# Patient Record
Sex: FEMALE | Race: WHITE | NOT HISPANIC OR LATINO | Employment: FULL TIME | URBAN - NONMETROPOLITAN AREA
[De-identification: names, ages, dates, MRNs, and addresses within clinical notes are randomized per-mention and may not be internally consistent; named-entity substitution may affect disease eponyms.]

---

## 2018-07-26 ENCOUNTER — CONVERSION ENCOUNTER (OUTPATIENT)
Dept: FAMILY MEDICINE CLINIC | Age: 58
End: 2018-07-26

## 2018-07-27 LAB
ALBUMIN SERPL-MCNC: 4.4 G/DL
ALBUMIN/GLOB SERPL: 1.8 {RATIO}
ALP SERPL-CCNC: 95 IU/L
ALT SERPL-CCNC: 32 IU/L
AST SERPL-CCNC: 22 IU/L
BASOPHILS # BLD AUTO: 0 X10E3/UL
BASOPHILS NFR BLD AUTO: 1 %
BILIRUB SERPL-MCNC: 0.6 MG/DL
BUN SERPL-MCNC: 21 MG/DL
BUN/CREAT SERPL: 20
CALCIUM SERPL-MCNC: 9.4 MG/DL
CHLORIDE SERPL-SCNC: 103 MMOL/L
CHOLEST SERPL-MCNC: 184 MG/DL
CO2 SERPL-SCNC: 22 MMOL/L
CONV IMMATURE GRAN: 0 %
CONV TOTAL PROTEIN: 6.9 G/DL
CREAT UR-MCNC: 1.07 MG/DL
EOSINOPHIL # BLD AUTO: 0.1 X10E3/UL
EOSINOPHIL # BLD AUTO: 2 %
ERYTHROCYTE [DISTWIDTH] IN BLOOD BY AUTOMATED COUNT: 13.9 %
GLOBULIN UR ELPH-MCNC: 2.5 G/DL
GLUCOSE SERPL-MCNC: 92 MG/DL
HCT VFR BLD AUTO: 39.7 %
HDLC SERPL-MCNC: 55 MG/DL
HGB BLD-MCNC: 13.6 G/DL
IMM GRANULOCYTES # BLD: 0 X10E3/UL
LDLC SERPL CALC-MCNC: 108 MG/DL
LYMPHOCYTES # BLD AUTO: 1.7 X10E3/UL
LYMPHOCYTES NFR BLD AUTO: 31 %
MCH RBC QN AUTO: 31.1 PG
MCHC RBC AUTO-ENTMCNC: 34.3 G/DL
MCV RBC AUTO: 91 FL
MONOCYTES # BLD AUTO: 0.6 X10E3/UL
MONOCYTES NFR BLD AUTO: 10 %
NEUTROPHILS # BLD AUTO: 3 X10E3/UL
NEUTROPHILS NFR BLD AUTO: 56 %
PLATELET # BLD AUTO: 249 X10E3/UL
POTASSIUM SERPL-SCNC: 4.3 MMOL/L
RBC # BLD AUTO: 4.38 X10E6/UL
SODIUM SERPL-SCNC: 139 MMOL/L
TRIGL SERPL-MCNC: 107 MG/DL
TSH SERPL-ACNC: 2.67 UIU/ML
VIT B12 SERPL-MCNC: 513 PG/ML
VLDLC SERPL-MCNC: 21 MG/DL
WBC # BLD AUTO: 5.4 X10E3/UL

## 2018-07-31 ENCOUNTER — OFFICE VISIT CONVERTED (OUTPATIENT)
Dept: FAMILY MEDICINE CLINIC | Age: 58
End: 2018-07-31
Attending: NURSE PRACTITIONER

## 2019-03-14 ENCOUNTER — CONVERSION ENCOUNTER (OUTPATIENT)
Dept: FAMILY MEDICINE CLINIC | Age: 59
End: 2019-03-14

## 2019-03-15 LAB
ALBUMIN SERPL-MCNC: 4.5 G/DL
ALBUMIN/GLOB SERPL: 2 {RATIO}
ALP SERPL-CCNC: 80 IU/L
ALT SERPL-CCNC: 16 IU/L
AST SERPL-CCNC: 14 IU/L
BILIRUB SERPL-MCNC: 0.6 MG/DL
BUN SERPL-MCNC: 18 MG/DL
BUN/CREAT SERPL: 17
CALCIUM SERPL-MCNC: 9.1 MG/DL
CHLORIDE SERPL-SCNC: 106 MMOL/L
CHOLEST SERPL-MCNC: 163 MG/DL
CO2 SERPL-SCNC: 20 MMOL/L
CONV TOTAL PROTEIN: 6.7 G/DL
CREAT UR-MCNC: 1.08 MG/DL
GLOBULIN UR ELPH-MCNC: 2.2 G/DL
GLUCOSE SERPL-MCNC: 92 MG/DL
HDLC SERPL-MCNC: 52 MG/DL
LDLC SERPL CALC-MCNC: 85 MG/DL
POTASSIUM SERPL-SCNC: 4.4 MMOL/L
SODIUM SERPL-SCNC: 143 MMOL/L
TRIGL SERPL-MCNC: 128 MG/DL
VLDLC SERPL-MCNC: 26 MG/DL

## 2019-03-19 ENCOUNTER — OFFICE VISIT CONVERTED (OUTPATIENT)
Dept: FAMILY MEDICINE CLINIC | Age: 59
End: 2019-03-19
Attending: NURSE PRACTITIONER

## 2019-05-07 ENCOUNTER — HOSPITAL ENCOUNTER (OUTPATIENT)
Dept: OTHER | Facility: HOSPITAL | Age: 59
Discharge: HOME | End: 2019-05-07
Attending: FAMILY MEDICINE

## 2019-05-07 ENCOUNTER — OFFICE VISIT CONVERTED (OUTPATIENT)
Dept: FAMILY MEDICINE CLINIC | Age: 59
End: 2019-05-07
Attending: FAMILY MEDICINE

## 2019-05-08 ENCOUNTER — HOSPITAL ENCOUNTER (OUTPATIENT)
Dept: PHYSICAL THERAPY | Facility: CLINIC | Age: 59
Setting detail: RECURRING SERIES
Discharge: HOME | End: 2019-06-10
Attending: FAMILY MEDICINE

## 2019-09-06 ENCOUNTER — CONVERSION ENCOUNTER (OUTPATIENT)
Dept: FAMILY MEDICINE CLINIC | Age: 59
End: 2019-09-06

## 2019-09-07 LAB
ALBUMIN SERPL-MCNC: 4.5 G/DL
ALBUMIN/GLOB SERPL: 2 {RATIO}
ALP SERPL-CCNC: 80 IU/L
ALT SERPL-CCNC: 23 IU/L
AST SERPL-CCNC: 18 IU/L
BILIRUB SERPL-MCNC: 0.7 MG/DL
BUN SERPL-MCNC: 16 MG/DL
BUN/CREAT SERPL: 16
CALCIUM SERPL-MCNC: 9.5 MG/DL
CHLORIDE SERPL-SCNC: 104 MMOL/L
CHOLEST SERPL-MCNC: 171 MG/DL
CO2 SERPL-SCNC: 21 MMOL/L
CONV TOTAL PROTEIN: 6.8 G/DL
CREAT UR-MCNC: 0.99 MG/DL
GLOBULIN UR ELPH-MCNC: 2.3 G/DL
GLUCOSE SERPL-MCNC: 90 MG/DL
HDLC SERPL-MCNC: 59 MG/DL
LDLC SERPL CALC-MCNC: 96 MG/DL
POTASSIUM SERPL-SCNC: 4.8 MMOL/L
SODIUM SERPL-SCNC: 141 MMOL/L
TRIGL SERPL-MCNC: 82 MG/DL
VLDLC SERPL-MCNC: 16 MG/DL

## 2019-09-13 ENCOUNTER — OFFICE VISIT CONVERTED (OUTPATIENT)
Dept: FAMILY MEDICINE CLINIC | Age: 59
End: 2019-09-13
Attending: NURSE PRACTITIONER

## 2019-09-18 ENCOUNTER — HOSPITAL ENCOUNTER (OUTPATIENT)
Dept: OTHER | Facility: HOSPITAL | Age: 59
Discharge: HOME | End: 2019-09-18
Attending: NURSE PRACTITIONER

## 2019-10-03 ENCOUNTER — HOSPITAL ENCOUNTER (OUTPATIENT)
Dept: OTHER | Facility: HOSPITAL | Age: 59
Discharge: HOME | End: 2019-10-03
Attending: NURSE PRACTITIONER

## 2020-04-21 ENCOUNTER — OFFICE VISIT CONVERTED (OUTPATIENT)
Dept: FAMILY MEDICINE CLINIC | Age: 60
End: 2020-04-21
Attending: NURSE PRACTITIONER

## 2020-09-17 ENCOUNTER — OFFICE VISIT CONVERTED (OUTPATIENT)
Dept: FAMILY MEDICINE CLINIC | Age: 60
End: 2020-09-17
Attending: NURSE PRACTITIONER

## 2020-09-18 LAB
ALBUMIN SERPL-MCNC: 4.7 G/DL
ALBUMIN/GLOB SERPL: 2 {RATIO}
ALP SERPL-CCNC: 76 IU/L
ALT SERPL-CCNC: 25 IU/L
AST SERPL-CCNC: 18 IU/L
BILIRUB SERPL-MCNC: 0.9 MG/DL
BUN SERPL-MCNC: 18 MG/DL
BUN/CREAT SERPL: 19
CALCIUM SERPL-MCNC: 9.8 MG/DL
CHLORIDE SERPL-SCNC: 100 MMOL/L
CHOLEST SERPL-MCNC: 186 MG/DL
CO2 SERPL-SCNC: 22 MMOL/L
CONV TOTAL PROTEIN: 7 G/DL
CREAT UR-MCNC: 0.97 MG/DL
GLOBULIN UR ELPH-MCNC: 2.3 G/DL
GLUCOSE SERPL-MCNC: 92 MG/DL
HDLC SERPL-MCNC: 67 MG/DL
LDLC SERPL CALC-MCNC: 108 MG/DL
POTASSIUM SERPL-SCNC: 4.6 MMOL/L
SODIUM SERPL-SCNC: 139 MMOL/L
TRIGL SERPL-MCNC: 59 MG/DL
VLDLC SERPL-MCNC: 11 MG/DL

## 2020-11-03 ENCOUNTER — HOSPITAL ENCOUNTER (OUTPATIENT)
Dept: OTHER | Facility: HOSPITAL | Age: 60
Discharge: HOME | End: 2020-11-03
Attending: NURSE PRACTITIONER

## 2021-04-14 ENCOUNTER — OFFICE VISIT CONVERTED (OUTPATIENT)
Dept: FAMILY MEDICINE CLINIC | Age: 61
End: 2021-04-14
Attending: NURSE PRACTITIONER

## 2021-04-22 LAB
ALBUMIN SERPL-MCNC: 4.4 G/DL
ALBUMIN/GLOB SERPL: 2.1 {RATIO}
ALP SERPL-CCNC: 73 IU/L
ALT SERPL-CCNC: 19 IU/L
AST SERPL-CCNC: 15 IU/L
BILIRUB SERPL-MCNC: 1 MG/DL
BUN SERPL-MCNC: 16 MG/DL
BUN/CREAT SERPL: 15
CALCIUM SERPL-MCNC: 9.3 MG/DL
CHLORIDE SERPL-SCNC: 99 MMOL/L
CHOLEST SERPL-MCNC: 169 MG/DL
CO2 SERPL-SCNC: 24 MMOL/L
CONV TOTAL PROTEIN: 6.5 G/DL
CREAT UR-MCNC: 1.04 MG/DL
GLOBULIN UR ELPH-MCNC: 2.1 G/DL
GLUCOSE SERPL-MCNC: 89 MG/DL
HDLC SERPL-MCNC: 64 MG/DL
LDLC SERPL CALC-MCNC: 91 MG/DL
POTASSIUM SERPL-SCNC: 4.8 MMOL/L
SODIUM SERPL-SCNC: 140 MMOL/L
TRIGL SERPL-MCNC: 74 MG/DL
VLDLC SERPL-MCNC: 14 MG/DL

## 2021-07-01 VITALS
TEMPERATURE: 98 F | HEIGHT: 63 IN | BODY MASS INDEX: 26.35 KG/M2 | HEART RATE: 65 BPM | SYSTOLIC BLOOD PRESSURE: 142 MMHG | WEIGHT: 148.7 LBS | DIASTOLIC BLOOD PRESSURE: 93 MMHG

## 2021-07-01 VITALS
HEART RATE: 80 BPM | SYSTOLIC BLOOD PRESSURE: 119 MMHG | HEIGHT: 63 IN | TEMPERATURE: 97.5 F | WEIGHT: 150.2 LBS | BODY MASS INDEX: 26.61 KG/M2 | DIASTOLIC BLOOD PRESSURE: 79 MMHG

## 2021-07-01 VITALS
WEIGHT: 146 LBS | TEMPERATURE: 97.8 F | DIASTOLIC BLOOD PRESSURE: 90 MMHG | HEART RATE: 57 BPM | SYSTOLIC BLOOD PRESSURE: 134 MMHG | HEIGHT: 63 IN | BODY MASS INDEX: 25.87 KG/M2

## 2021-07-01 VITALS
SYSTOLIC BLOOD PRESSURE: 126 MMHG | DIASTOLIC BLOOD PRESSURE: 61 MMHG | TEMPERATURE: 98.5 F | HEIGHT: 63 IN | BODY MASS INDEX: 25.98 KG/M2 | HEART RATE: 83 BPM | WEIGHT: 146.6 LBS

## 2021-07-02 VITALS
HEART RATE: 64 BPM | TEMPERATURE: 96.9 F | BODY MASS INDEX: 25.91 KG/M2 | SYSTOLIC BLOOD PRESSURE: 109 MMHG | WEIGHT: 146.2 LBS | HEIGHT: 63 IN | DIASTOLIC BLOOD PRESSURE: 76 MMHG

## 2021-10-05 RX ORDER — LOSARTAN POTASSIUM 50 MG/1
TABLET ORAL
Qty: 30 TABLET | Refills: 0 | Status: SHIPPED | OUTPATIENT
Start: 2021-10-05 | End: 2021-11-02

## 2021-10-05 RX ORDER — PRAVASTATIN SODIUM 10 MG
10 TABLET ORAL
COMMUNITY
Start: 2021-08-21 | End: 2021-11-10 | Stop reason: SDUPTHER

## 2021-10-05 RX ORDER — LOSARTAN POTASSIUM 50 MG/1
1 TABLET ORAL DAILY
COMMUNITY
Start: 2021-07-10 | End: 2021-10-05

## 2021-11-02 RX ORDER — LOSARTAN POTASSIUM 50 MG/1
TABLET ORAL
Qty: 30 TABLET | Refills: 0 | Status: SHIPPED | OUTPATIENT
Start: 2021-11-02 | End: 2021-11-10 | Stop reason: SDUPTHER

## 2021-11-10 ENCOUNTER — CLINICAL SUPPORT (OUTPATIENT)
Dept: FAMILY MEDICINE CLINIC | Age: 61
End: 2021-11-10

## 2021-11-10 ENCOUNTER — OFFICE VISIT (OUTPATIENT)
Dept: FAMILY MEDICINE CLINIC | Age: 61
End: 2021-11-10

## 2021-11-10 ENCOUNTER — LAB (OUTPATIENT)
Dept: LAB | Facility: HOSPITAL | Age: 61
End: 2021-11-10

## 2021-11-10 VITALS
SYSTOLIC BLOOD PRESSURE: 138 MMHG | HEART RATE: 62 BPM | DIASTOLIC BLOOD PRESSURE: 88 MMHG | BODY MASS INDEX: 26.47 KG/M2 | HEIGHT: 63 IN | WEIGHT: 149.4 LBS | TEMPERATURE: 98.4 F | OXYGEN SATURATION: 99 %

## 2021-11-10 DIAGNOSIS — M79.605 PAIN IN BOTH LOWER EXTREMITIES: ICD-10-CM

## 2021-11-10 DIAGNOSIS — E78.2 MIXED HYPERLIPIDEMIA: ICD-10-CM

## 2021-11-10 DIAGNOSIS — R53.83 FATIGUE, UNSPECIFIED TYPE: ICD-10-CM

## 2021-11-10 DIAGNOSIS — I10 ESSENTIAL HYPERTENSION: Primary | ICD-10-CM

## 2021-11-10 DIAGNOSIS — Z23 NEED FOR COVID-19 VACCINE: Primary | ICD-10-CM

## 2021-11-10 DIAGNOSIS — I10 ESSENTIAL HYPERTENSION: ICD-10-CM

## 2021-11-10 DIAGNOSIS — M79.604 PAIN IN BOTH LOWER EXTREMITIES: ICD-10-CM

## 2021-11-10 LAB
25(OH)D3 SERPL-MCNC: 25.2 NG/ML
ALBUMIN SERPL-MCNC: 4.5 G/DL (ref 3.5–5.2)
ALBUMIN/GLOB SERPL: 1.9 G/DL
ALP SERPL-CCNC: 81 U/L (ref 39–117)
ALT SERPL W P-5'-P-CCNC: 22 U/L (ref 1–33)
ANION GAP SERPL CALCULATED.3IONS-SCNC: 4.6 MMOL/L (ref 5–15)
AST SERPL-CCNC: 23 U/L (ref 1–32)
BASOPHILS # BLD AUTO: 0.02 10*3/MM3 (ref 0–0.2)
BASOPHILS NFR BLD AUTO: 0.4 % (ref 0–1.5)
BILIRUB SERPL-MCNC: 0.6 MG/DL (ref 0–1.2)
BUN SERPL-MCNC: 14 MG/DL (ref 8–23)
BUN/CREAT SERPL: 15.6 (ref 7–25)
CALCIUM SPEC-SCNC: 9.4 MG/DL (ref 8.6–10.5)
CHLORIDE SERPL-SCNC: 103 MMOL/L (ref 98–107)
CHOLEST SERPL-MCNC: 165 MG/DL (ref 0–200)
CO2 SERPL-SCNC: 28.4 MMOL/L (ref 22–29)
CREAT SERPL-MCNC: 0.9 MG/DL (ref 0.57–1)
DEPRECATED RDW RBC AUTO: 40.5 FL (ref 37–54)
EOSINOPHIL # BLD AUTO: 0.1 10*3/MM3 (ref 0–0.4)
EOSINOPHIL NFR BLD AUTO: 2 % (ref 0.3–6.2)
ERYTHROCYTE [DISTWIDTH] IN BLOOD BY AUTOMATED COUNT: 12.1 % (ref 12.3–15.4)
GFR SERPL CREATININE-BSD FRML MDRD: 64 ML/MIN/1.73
GLOBULIN UR ELPH-MCNC: 2.4 GM/DL
GLUCOSE SERPL-MCNC: 99 MG/DL (ref 65–99)
HCT VFR BLD AUTO: 37.4 % (ref 34–46.6)
HDLC SERPL-MCNC: 67 MG/DL (ref 40–60)
HGB BLD-MCNC: 13.2 G/DL (ref 12–15.9)
IMM GRANULOCYTES # BLD AUTO: 0.01 10*3/MM3 (ref 0–0.05)
IMM GRANULOCYTES NFR BLD AUTO: 0.2 % (ref 0–0.5)
LDLC SERPL CALC-MCNC: 83 MG/DL (ref 0–100)
LDLC/HDLC SERPL: 1.23 {RATIO}
LYMPHOCYTES # BLD AUTO: 1.56 10*3/MM3 (ref 0.7–3.1)
LYMPHOCYTES NFR BLD AUTO: 31.6 % (ref 19.6–45.3)
MCH RBC QN AUTO: 31.8 PG (ref 26.6–33)
MCHC RBC AUTO-ENTMCNC: 35.3 G/DL (ref 31.5–35.7)
MCV RBC AUTO: 90.1 FL (ref 79–97)
MONOCYTES # BLD AUTO: 0.49 10*3/MM3 (ref 0.1–0.9)
MONOCYTES NFR BLD AUTO: 9.9 % (ref 5–12)
NEUTROPHILS NFR BLD AUTO: 2.76 10*3/MM3 (ref 1.7–7)
NEUTROPHILS NFR BLD AUTO: 55.9 % (ref 42.7–76)
PLATELET # BLD AUTO: 270 10*3/MM3 (ref 140–450)
PMV BLD AUTO: 8.6 FL (ref 6–12)
POTASSIUM SERPL-SCNC: 4.6 MMOL/L (ref 3.5–5.2)
PROT SERPL-MCNC: 6.9 G/DL (ref 6–8.5)
RBC # BLD AUTO: 4.15 10*6/MM3 (ref 3.77–5.28)
SODIUM SERPL-SCNC: 136 MMOL/L (ref 136–145)
TRIGL SERPL-MCNC: 77 MG/DL (ref 0–150)
TSH SERPL DL<=0.05 MIU/L-ACNC: 2.15 UIU/ML (ref 0.27–4.2)
VIT B12 BLD-MCNC: 440 PG/ML (ref 211–946)
VLDLC SERPL-MCNC: 15 MG/DL (ref 5–40)
WBC # BLD AUTO: 4.94 10*3/MM3 (ref 3.4–10.8)

## 2021-11-10 PROCEDURE — 80053 COMPREHEN METABOLIC PANEL: CPT

## 2021-11-10 PROCEDURE — 82607 VITAMIN B-12: CPT

## 2021-11-10 PROCEDURE — 0003A COVID-19 (PFIZER): CPT | Performed by: FAMILY MEDICINE

## 2021-11-10 PROCEDURE — 99214 OFFICE O/P EST MOD 30 MIN: CPT | Performed by: NURSE PRACTITIONER

## 2021-11-10 PROCEDURE — 36415 COLL VENOUS BLD VENIPUNCTURE: CPT

## 2021-11-10 PROCEDURE — 82306 VITAMIN D 25 HYDROXY: CPT

## 2021-11-10 PROCEDURE — 80061 LIPID PANEL: CPT | Performed by: NURSE PRACTITIONER

## 2021-11-10 PROCEDURE — 91300 COVID-19 (PFIZER): CPT | Performed by: FAMILY MEDICINE

## 2021-11-10 PROCEDURE — 85025 COMPLETE CBC W/AUTO DIFF WBC: CPT

## 2021-11-10 PROCEDURE — 84443 ASSAY THYROID STIM HORMONE: CPT | Performed by: NURSE PRACTITIONER

## 2021-11-10 RX ORDER — LOSARTAN POTASSIUM 50 MG/1
50 TABLET ORAL DAILY
Qty: 90 TABLET | Refills: 1 | Status: SHIPPED | OUTPATIENT
Start: 2021-11-10 | End: 2022-01-06

## 2021-11-10 RX ORDER — PRAVASTATIN SODIUM 10 MG
10 TABLET ORAL
Qty: 90 TABLET | Refills: 1 | Status: SHIPPED | OUTPATIENT
Start: 2021-11-10 | End: 2022-05-10 | Stop reason: SDUPTHER

## 2021-11-10 RX ORDER — PHENOL 1.4 %
10 AEROSOL, SPRAY (ML) MUCOUS MEMBRANE NIGHTLY
COMMUNITY

## 2022-01-06 DIAGNOSIS — I10 ESSENTIAL HYPERTENSION: ICD-10-CM

## 2022-01-06 RX ORDER — LOSARTAN POTASSIUM 50 MG/1
TABLET ORAL
Qty: 30 TABLET | Refills: 4 | Status: SHIPPED | OUTPATIENT
Start: 2022-01-06 | End: 2022-05-10 | Stop reason: SDUPTHER

## 2022-05-10 ENCOUNTER — OFFICE VISIT (OUTPATIENT)
Dept: FAMILY MEDICINE CLINIC | Age: 62
End: 2022-05-10

## 2022-05-10 VITALS
BODY MASS INDEX: 26.65 KG/M2 | HEART RATE: 72 BPM | WEIGHT: 150.4 LBS | SYSTOLIC BLOOD PRESSURE: 125 MMHG | DIASTOLIC BLOOD PRESSURE: 88 MMHG | OXYGEN SATURATION: 98 % | HEIGHT: 63 IN

## 2022-05-10 DIAGNOSIS — H69.82 EUSTACHIAN TUBE DYSFUNCTION, LEFT: ICD-10-CM

## 2022-05-10 DIAGNOSIS — I10 ESSENTIAL HYPERTENSION: Primary | ICD-10-CM

## 2022-05-10 DIAGNOSIS — R94.31 ABNORMAL EKG: ICD-10-CM

## 2022-05-10 DIAGNOSIS — E78.2 MIXED HYPERLIPIDEMIA: ICD-10-CM

## 2022-05-10 DIAGNOSIS — R06.00 DYSPNEA, UNSPECIFIED TYPE: ICD-10-CM

## 2022-05-10 PROBLEM — H69.92 EUSTACHIAN TUBE DYSFUNCTION, LEFT: Status: ACTIVE | Noted: 2022-05-10

## 2022-05-10 PROCEDURE — 99214 OFFICE O/P EST MOD 30 MIN: CPT | Performed by: NURSE PRACTITIONER

## 2022-05-10 RX ORDER — LOSARTAN POTASSIUM 50 MG/1
50 TABLET ORAL DAILY
Qty: 90 TABLET | Refills: 1 | Status: SHIPPED | OUTPATIENT
Start: 2022-05-10 | End: 2022-11-10 | Stop reason: SDUPTHER

## 2022-05-10 RX ORDER — LORATADINE 10 MG/1
10 TABLET ORAL DAILY PRN
COMMUNITY
End: 2022-11-10

## 2022-05-10 RX ORDER — PRAVASTATIN SODIUM 10 MG
10 TABLET ORAL
Qty: 90 TABLET | Refills: 1 | Status: SHIPPED | OUTPATIENT
Start: 2022-05-10 | End: 2022-11-10 | Stop reason: SDUPTHER

## 2022-05-11 PROCEDURE — 93000 ELECTROCARDIOGRAM COMPLETE: CPT | Performed by: FAMILY MEDICINE

## 2022-05-13 ENCOUNTER — LAB (OUTPATIENT)
Dept: LAB | Facility: HOSPITAL | Age: 62
End: 2022-05-13

## 2022-05-13 ENCOUNTER — IMMUNIZATION (OUTPATIENT)
Dept: FAMILY MEDICINE CLINIC | Age: 62
End: 2022-05-13

## 2022-05-13 DIAGNOSIS — Z23 IMMUNIZATION DUE: Primary | ICD-10-CM

## 2022-05-13 DIAGNOSIS — R06.00 DYSPNEA, UNSPECIFIED TYPE: ICD-10-CM

## 2022-05-13 DIAGNOSIS — I10 ESSENTIAL HYPERTENSION: ICD-10-CM

## 2022-05-13 LAB
ALBUMIN SERPL-MCNC: 4.6 G/DL (ref 3.5–5.2)
ALBUMIN/GLOB SERPL: 2.4 G/DL
ALP SERPL-CCNC: 78 U/L (ref 39–117)
ALT SERPL W P-5'-P-CCNC: 32 U/L (ref 1–33)
ANION GAP SERPL CALCULATED.3IONS-SCNC: 11.3 MMOL/L (ref 5–15)
AST SERPL-CCNC: 24 U/L (ref 1–32)
BILIRUB SERPL-MCNC: 0.9 MG/DL (ref 0–1.2)
BUN SERPL-MCNC: 15 MG/DL (ref 8–23)
BUN/CREAT SERPL: 14.7 (ref 7–25)
CALCIUM SPEC-SCNC: 9.2 MG/DL (ref 8.6–10.5)
CHLORIDE SERPL-SCNC: 100 MMOL/L (ref 98–107)
CHOLEST SERPL-MCNC: 172 MG/DL (ref 0–200)
CO2 SERPL-SCNC: 22.7 MMOL/L (ref 22–29)
CREAT SERPL-MCNC: 1.02 MG/DL (ref 0.57–1)
EGFRCR SERPLBLD CKD-EPI 2021: 62.7 ML/MIN/1.73
GLOBULIN UR ELPH-MCNC: 1.9 GM/DL
GLUCOSE SERPL-MCNC: 96 MG/DL (ref 65–99)
HDLC SERPL-MCNC: 61 MG/DL (ref 40–60)
LDLC SERPL CALC-MCNC: 96 MG/DL (ref 0–100)
LDLC/HDLC SERPL: 1.56 {RATIO}
MAGNESIUM SERPL-MCNC: 2.1 MG/DL (ref 1.6–2.4)
POTASSIUM SERPL-SCNC: 4.4 MMOL/L (ref 3.5–5.2)
PROT SERPL-MCNC: 6.5 G/DL (ref 6–8.5)
SODIUM SERPL-SCNC: 134 MMOL/L (ref 136–145)
TRIGL SERPL-MCNC: 80 MG/DL (ref 0–150)
TSH SERPL DL<=0.05 MIU/L-ACNC: 2.44 UIU/ML (ref 0.27–4.2)
VLDLC SERPL-MCNC: 15 MG/DL (ref 5–40)

## 2022-05-13 PROCEDURE — 83735 ASSAY OF MAGNESIUM: CPT

## 2022-05-13 PROCEDURE — 80053 COMPREHEN METABOLIC PANEL: CPT

## 2022-05-13 PROCEDURE — 80061 LIPID PANEL: CPT

## 2022-05-13 PROCEDURE — 84443 ASSAY THYROID STIM HORMONE: CPT

## 2022-05-13 PROCEDURE — 0054A COVID-19 (PFIZER) 12+ YRS: CPT | Performed by: FAMILY MEDICINE

## 2022-05-13 PROCEDURE — 91305 COVID-19 (PFIZER) 12+ YRS: CPT | Performed by: FAMILY MEDICINE

## 2022-05-13 PROCEDURE — 36415 COLL VENOUS BLD VENIPUNCTURE: CPT

## 2022-05-14 DIAGNOSIS — E78.2 MIXED HYPERLIPIDEMIA: ICD-10-CM

## 2022-05-16 RX ORDER — PRAVASTATIN SODIUM 10 MG
10 TABLET ORAL
Qty: 90 TABLET | Refills: 1 | OUTPATIENT
Start: 2022-05-16

## 2022-05-19 DIAGNOSIS — I47.20 VENTRICULAR TACHYCARDIA: Primary | ICD-10-CM

## 2022-06-07 ENCOUNTER — HOSPITAL ENCOUNTER (OUTPATIENT)
Dept: CARDIOLOGY | Facility: HOSPITAL | Age: 62
Discharge: HOME | End: 2022-06-07
Admitting: NURSE PRACTITIONER

## 2022-06-07 DIAGNOSIS — R06.00 DYSPNEA, UNSPECIFIED TYPE: ICD-10-CM

## 2022-06-07 LAB
BH CV ECHO MEAS - AO ROOT DIAM: 2.6 CM
BH CV ECHO MEAS - EF(MOD-BP): 65 %
BH CV ECHO MEAS - IVSD: 1 CM
BH CV ECHO MEAS - LA DIMENSION: 3.6 CM
BH CV ECHO MEAS - LAT PEAK E' VEL: 6 CM/SEC
BH CV ECHO MEAS - LVIDD: 4.6 CM
BH CV ECHO MEAS - LVIDS: 2.9 CM
BH CV ECHO MEAS - LVPWD: 1 CM
BH CV ECHO MEAS - MED PEAK E' VEL: 4 CM/SEC
BH CV ECHO MEAS - MV A MAX VEL: 113 CM/SEC
BH CV ECHO MEAS - MV DEC TIME: 183 MSEC
BH CV ECHO MEAS - MV E MAX VEL: 96 CM/SEC
BH CV ECHO MEAS - MV E/A: 0.9
BH CV ECHO MEASUREMENTS AVERAGE E/E' RATIO: 19.2
IVRT: 61 MSEC
LEFT ATRIUM VOLUME INDEX: 21 ML/M2
MAXIMAL PREDICTED HEART RATE: 159 BPM
STRESS TARGET HR: 135 BPM

## 2022-06-07 PROCEDURE — 93306 TTE W/DOPPLER COMPLETE: CPT

## 2022-06-07 PROCEDURE — 93306 TTE W/DOPPLER COMPLETE: CPT | Performed by: INTERNAL MEDICINE

## 2022-06-08 ENCOUNTER — TELEPHONE (OUTPATIENT)
Dept: FAMILY MEDICINE CLINIC | Age: 62
End: 2022-06-08

## 2022-08-03 ENCOUNTER — OFFICE VISIT (OUTPATIENT)
Dept: CARDIOLOGY | Facility: CLINIC | Age: 62
End: 2022-08-03

## 2022-08-03 VITALS
DIASTOLIC BLOOD PRESSURE: 88 MMHG | BODY MASS INDEX: 27.42 KG/M2 | WEIGHT: 149 LBS | SYSTOLIC BLOOD PRESSURE: 126 MMHG | HEART RATE: 66 BPM | HEIGHT: 62 IN

## 2022-08-03 DIAGNOSIS — I49.3 FREQUENT PVCS: Primary | ICD-10-CM

## 2022-08-03 DIAGNOSIS — R94.31 HOLTER MONITOR, ABNORMAL: ICD-10-CM

## 2022-08-03 DIAGNOSIS — R06.09 EXERTIONAL DYSPNEA: ICD-10-CM

## 2022-08-03 PROCEDURE — 99203 OFFICE O/P NEW LOW 30 MIN: CPT | Performed by: INTERNAL MEDICINE

## 2022-09-08 ENCOUNTER — TELEPHONE (OUTPATIENT)
Dept: CARDIOLOGY | Facility: CLINIC | Age: 62
End: 2022-09-08

## 2022-11-10 ENCOUNTER — OFFICE VISIT (OUTPATIENT)
Dept: FAMILY MEDICINE CLINIC | Age: 62
End: 2022-11-10

## 2022-11-10 VITALS
DIASTOLIC BLOOD PRESSURE: 82 MMHG | HEART RATE: 81 BPM | WEIGHT: 149 LBS | HEIGHT: 62 IN | SYSTOLIC BLOOD PRESSURE: 138 MMHG | OXYGEN SATURATION: 99 % | BODY MASS INDEX: 27.42 KG/M2

## 2022-11-10 DIAGNOSIS — E78.2 MIXED HYPERLIPIDEMIA: ICD-10-CM

## 2022-11-10 DIAGNOSIS — Z12.31 ENCOUNTER FOR SCREENING MAMMOGRAM FOR MALIGNANT NEOPLASM OF BREAST: ICD-10-CM

## 2022-11-10 DIAGNOSIS — E55.9 VITAMIN D DEFICIENCY: Primary | ICD-10-CM

## 2022-11-10 DIAGNOSIS — I10 ESSENTIAL HYPERTENSION: ICD-10-CM

## 2022-11-10 DIAGNOSIS — R53.83 OTHER FATIGUE: ICD-10-CM

## 2022-11-10 DIAGNOSIS — J01.10 ACUTE NON-RECURRENT FRONTAL SINUSITIS: ICD-10-CM

## 2022-11-10 PROCEDURE — 99214 OFFICE O/P EST MOD 30 MIN: CPT | Performed by: NURSE PRACTITIONER

## 2022-11-10 RX ORDER — FEXOFENADINE HCL 180 MG/1
180 TABLET ORAL DAILY
COMMUNITY
End: 2023-01-19

## 2022-11-10 RX ORDER — LOSARTAN POTASSIUM 50 MG/1
50 TABLET ORAL DAILY
Qty: 90 TABLET | Refills: 1 | Status: SHIPPED | OUTPATIENT
Start: 2022-11-10

## 2022-11-10 RX ORDER — PRAVASTATIN SODIUM 10 MG
10 TABLET ORAL
Qty: 90 TABLET | Refills: 1 | Status: SHIPPED | OUTPATIENT
Start: 2022-11-10

## 2022-11-10 RX ORDER — AMOXICILLIN AND CLAVULANATE POTASSIUM 875; 125 MG/1; MG/1
1 TABLET, FILM COATED ORAL 2 TIMES DAILY
Qty: 20 TABLET | Refills: 0 | Status: SHIPPED | OUTPATIENT
Start: 2022-11-10 | End: 2023-01-19

## 2022-11-21 ENCOUNTER — TELEPHONE (OUTPATIENT)
Dept: FAMILY MEDICINE CLINIC | Age: 62
End: 2022-11-21

## 2022-11-22 ENCOUNTER — LAB (OUTPATIENT)
Dept: LAB | Facility: HOSPITAL | Age: 62
End: 2022-11-22

## 2022-11-22 DIAGNOSIS — E55.9 VITAMIN D DEFICIENCY: ICD-10-CM

## 2022-11-22 DIAGNOSIS — R53.83 OTHER FATIGUE: ICD-10-CM

## 2022-11-22 DIAGNOSIS — E78.2 MIXED HYPERLIPIDEMIA: ICD-10-CM

## 2022-11-22 LAB
25(OH)D3 SERPL-MCNC: 23.7 NG/ML (ref 30–100)
ALBUMIN SERPL-MCNC: 4.1 G/DL (ref 3.5–5.2)
ALBUMIN/GLOB SERPL: 1.9 G/DL
ALP SERPL-CCNC: 82 U/L (ref 39–117)
ALT SERPL W P-5'-P-CCNC: 24 U/L (ref 1–33)
ANION GAP SERPL CALCULATED.3IONS-SCNC: 10.5 MMOL/L (ref 5–15)
AST SERPL-CCNC: 17 U/L (ref 1–32)
BASOPHILS # BLD AUTO: 0.04 10*3/MM3 (ref 0–0.2)
BASOPHILS NFR BLD AUTO: 0.7 % (ref 0–1.5)
BILIRUB SERPL-MCNC: 0.7 MG/DL (ref 0–1.2)
BUN SERPL-MCNC: 17 MG/DL (ref 8–23)
BUN/CREAT SERPL: 16 (ref 7–25)
CALCIUM SPEC-SCNC: 9.1 MG/DL (ref 8.6–10.5)
CHLORIDE SERPL-SCNC: 103 MMOL/L (ref 98–107)
CHOLEST SERPL-MCNC: 179 MG/DL (ref 0–200)
CO2 SERPL-SCNC: 24.5 MMOL/L (ref 22–29)
CREAT SERPL-MCNC: 1.06 MG/DL (ref 0.57–1)
DEPRECATED RDW RBC AUTO: 45.9 FL (ref 37–54)
EGFRCR SERPLBLD CKD-EPI 2021: 59.9 ML/MIN/1.73
EOSINOPHIL # BLD AUTO: 0.13 10*3/MM3 (ref 0–0.4)
EOSINOPHIL NFR BLD AUTO: 2.3 % (ref 0.3–6.2)
ERYTHROCYTE [DISTWIDTH] IN BLOOD BY AUTOMATED COUNT: 13 % (ref 12.3–15.4)
GLOBULIN UR ELPH-MCNC: 2.2 GM/DL
GLUCOSE SERPL-MCNC: 91 MG/DL (ref 65–99)
HCT VFR BLD AUTO: 41.9 % (ref 34–46.6)
HDLC SERPL-MCNC: 60 MG/DL (ref 40–60)
HGB BLD-MCNC: 13.7 G/DL (ref 12–15.9)
IMM GRANULOCYTES # BLD AUTO: 0 10*3/MM3 (ref 0–0.05)
IMM GRANULOCYTES NFR BLD AUTO: 0 % (ref 0–0.5)
LDLC SERPL CALC-MCNC: 103 MG/DL (ref 0–100)
LDLC/HDLC SERPL: 1.68 {RATIO}
LYMPHOCYTES # BLD AUTO: 1.81 10*3/MM3 (ref 0.7–3.1)
LYMPHOCYTES NFR BLD AUTO: 31.6 % (ref 19.6–45.3)
MCH RBC QN AUTO: 31.3 PG (ref 26.6–33)
MCHC RBC AUTO-ENTMCNC: 32.7 G/DL (ref 31.5–35.7)
MCV RBC AUTO: 95.7 FL (ref 79–97)
MONOCYTES # BLD AUTO: 0.59 10*3/MM3 (ref 0.1–0.9)
MONOCYTES NFR BLD AUTO: 10.3 % (ref 5–12)
NEUTROPHILS NFR BLD AUTO: 3.16 10*3/MM3 (ref 1.7–7)
NEUTROPHILS NFR BLD AUTO: 55.1 % (ref 42.7–76)
PLATELET # BLD AUTO: 285 10*3/MM3 (ref 140–450)
PMV BLD AUTO: 9.1 FL (ref 6–12)
POTASSIUM SERPL-SCNC: 4.5 MMOL/L (ref 3.5–5.2)
PROT SERPL-MCNC: 6.3 G/DL (ref 6–8.5)
RBC # BLD AUTO: 4.38 10*6/MM3 (ref 3.77–5.28)
SODIUM SERPL-SCNC: 138 MMOL/L (ref 136–145)
TRIGL SERPL-MCNC: 90 MG/DL (ref 0–150)
TSH SERPL DL<=0.05 MIU/L-ACNC: 2.7 UIU/ML (ref 0.27–4.2)
VIT B12 BLD-MCNC: 463 PG/ML (ref 211–946)
VLDLC SERPL-MCNC: 16 MG/DL (ref 5–40)
WBC NRBC COR # BLD: 5.73 10*3/MM3 (ref 3.4–10.8)

## 2022-11-22 PROCEDURE — 80061 LIPID PANEL: CPT

## 2022-11-22 PROCEDURE — 80050 GENERAL HEALTH PANEL: CPT

## 2022-11-22 PROCEDURE — 82306 VITAMIN D 25 HYDROXY: CPT

## 2022-11-22 PROCEDURE — 82607 VITAMIN B-12: CPT

## 2022-11-22 PROCEDURE — 36415 COLL VENOUS BLD VENIPUNCTURE: CPT

## 2023-01-11 ENCOUNTER — HOSPITAL ENCOUNTER (OUTPATIENT)
Dept: MAMMOGRAPHY | Facility: HOSPITAL | Age: 63
Discharge: HOME | End: 2023-01-11
Admitting: NURSE PRACTITIONER
Payer: COMMERCIAL

## 2023-01-11 ENCOUNTER — CLINICAL SUPPORT (OUTPATIENT)
Dept: FAMILY MEDICINE CLINIC | Age: 63
End: 2023-01-11
Payer: COMMERCIAL

## 2023-01-11 DIAGNOSIS — Z23 IMMUNIZATION DUE: Primary | ICD-10-CM

## 2023-01-11 DIAGNOSIS — Z12.31 ENCOUNTER FOR SCREENING MAMMOGRAM FOR MALIGNANT NEOPLASM OF BREAST: ICD-10-CM

## 2023-01-11 PROCEDURE — 0124A PR ADM SARSCOV2 30MCG/0.3ML BST: CPT | Performed by: FAMILY MEDICINE

## 2023-01-11 PROCEDURE — 77067 SCR MAMMO BI INCL CAD: CPT

## 2023-01-11 PROCEDURE — 77063 BREAST TOMOSYNTHESIS BI: CPT

## 2023-01-11 PROCEDURE — 91312 COVID-19 (PFIZER) BIVALENT BOOSTER 12+YRS: CPT | Performed by: FAMILY MEDICINE

## 2023-01-19 ENCOUNTER — OFFICE VISIT (OUTPATIENT)
Dept: FAMILY MEDICINE CLINIC | Age: 63
End: 2023-01-19
Payer: COMMERCIAL

## 2023-01-19 ENCOUNTER — HOSPITAL ENCOUNTER (OUTPATIENT)
Dept: GENERAL RADIOLOGY | Facility: HOSPITAL | Age: 63
Discharge: HOME | End: 2023-01-19
Admitting: NURSE PRACTITIONER
Payer: COMMERCIAL

## 2023-01-19 ENCOUNTER — TELEPHONE (OUTPATIENT)
Dept: FAMILY MEDICINE CLINIC | Age: 63
End: 2023-01-19

## 2023-01-19 VITALS
BODY MASS INDEX: 27.97 KG/M2 | OXYGEN SATURATION: 97 % | WEIGHT: 152 LBS | DIASTOLIC BLOOD PRESSURE: 90 MMHG | HEART RATE: 72 BPM | HEIGHT: 62 IN | SYSTOLIC BLOOD PRESSURE: 134 MMHG

## 2023-01-19 DIAGNOSIS — M79.642 LEFT HAND PAIN: ICD-10-CM

## 2023-01-19 DIAGNOSIS — M25.532 LEFT WRIST PAIN: Primary | ICD-10-CM

## 2023-01-19 PROCEDURE — 99213 OFFICE O/P EST LOW 20 MIN: CPT | Performed by: NURSE PRACTITIONER

## 2023-01-19 PROCEDURE — 73090 X-RAY EXAM OF FOREARM: CPT

## 2023-01-19 PROCEDURE — 73130 X-RAY EXAM OF HAND: CPT

## 2023-02-17 DIAGNOSIS — M79.642 LEFT HAND PAIN: ICD-10-CM

## 2023-02-17 DIAGNOSIS — M25.532 LEFT WRIST PAIN: ICD-10-CM

## 2023-03-01 ENCOUNTER — OFFICE VISIT (OUTPATIENT)
Dept: CARDIOLOGY | Facility: CLINIC | Age: 63
End: 2023-03-01
Payer: COMMERCIAL

## 2023-03-01 VITALS
HEART RATE: 55 BPM | HEIGHT: 62 IN | BODY MASS INDEX: 27.97 KG/M2 | SYSTOLIC BLOOD PRESSURE: 135 MMHG | WEIGHT: 152 LBS | DIASTOLIC BLOOD PRESSURE: 79 MMHG

## 2023-03-01 DIAGNOSIS — R94.31 HOLTER MONITOR, ABNORMAL: ICD-10-CM

## 2023-03-01 DIAGNOSIS — I49.3 FREQUENT PVCS: ICD-10-CM

## 2023-03-01 PROCEDURE — 99213 OFFICE O/P EST LOW 20 MIN: CPT | Performed by: INTERNAL MEDICINE

## 2023-03-17 DIAGNOSIS — M25.532 LEFT WRIST PAIN: ICD-10-CM

## 2023-03-17 DIAGNOSIS — M79.642 LEFT HAND PAIN: ICD-10-CM

## 2023-05-10 ENCOUNTER — OFFICE VISIT (OUTPATIENT)
Dept: FAMILY MEDICINE CLINIC | Age: 63
End: 2023-05-10
Payer: COMMERCIAL

## 2023-05-10 VITALS
SYSTOLIC BLOOD PRESSURE: 133 MMHG | TEMPERATURE: 98.2 F | HEART RATE: 96 BPM | DIASTOLIC BLOOD PRESSURE: 64 MMHG | BODY MASS INDEX: 28.34 KG/M2 | OXYGEN SATURATION: 98 % | WEIGHT: 154 LBS | HEIGHT: 62 IN

## 2023-05-10 DIAGNOSIS — Z11.59 SCREENING FOR VIRAL DISEASE: ICD-10-CM

## 2023-05-10 DIAGNOSIS — I10 ESSENTIAL HYPERTENSION: Primary | ICD-10-CM

## 2023-05-10 DIAGNOSIS — H92.02 LEFT EAR PAIN: ICD-10-CM

## 2023-05-10 DIAGNOSIS — E55.9 VITAMIN D DEFICIENCY: ICD-10-CM

## 2023-05-10 DIAGNOSIS — E78.2 MIXED HYPERLIPIDEMIA: ICD-10-CM

## 2023-05-10 PROBLEM — M79.605 PAIN IN BOTH LOWER EXTREMITIES: Status: RESOLVED | Noted: 2021-11-10 | Resolved: 2023-05-10

## 2023-05-10 PROBLEM — M79.604 PAIN IN BOTH LOWER EXTREMITIES: Status: RESOLVED | Noted: 2021-11-10 | Resolved: 2023-05-10

## 2023-05-10 PROBLEM — H69.92 EUSTACHIAN TUBE DYSFUNCTION, LEFT: Status: RESOLVED | Noted: 2022-05-10 | Resolved: 2023-05-10

## 2023-05-10 PROBLEM — Z12.31 ENCOUNTER FOR SCREENING MAMMOGRAM FOR MALIGNANT NEOPLASM OF BREAST: Status: RESOLVED | Noted: 2022-11-10 | Resolved: 2023-05-10

## 2023-05-10 PROBLEM — H69.82 EUSTACHIAN TUBE DYSFUNCTION, LEFT: Status: RESOLVED | Noted: 2022-05-10 | Resolved: 2023-05-10

## 2023-05-10 PROBLEM — J01.10 ACUTE NON-RECURRENT FRONTAL SINUSITIS: Status: RESOLVED | Noted: 2022-11-10 | Resolved: 2023-05-10

## 2023-05-10 PROBLEM — M25.532 LEFT WRIST PAIN: Status: RESOLVED | Noted: 2023-01-19 | Resolved: 2023-05-10

## 2023-05-10 PROCEDURE — 99214 OFFICE O/P EST MOD 30 MIN: CPT | Performed by: NURSE PRACTITIONER

## 2023-05-10 RX ORDER — LOSARTAN POTASSIUM 50 MG/1
50 TABLET ORAL DAILY
Qty: 90 TABLET | Refills: 1 | Status: SHIPPED | OUTPATIENT
Start: 2023-05-10

## 2023-05-10 RX ORDER — PRAVASTATIN SODIUM 10 MG
10 TABLET ORAL
Qty: 90 TABLET | Refills: 1 | Status: SHIPPED | OUTPATIENT
Start: 2023-05-10

## 2023-05-10 RX ORDER — LORATADINE 10 MG/1
1 CAPSULE, LIQUID FILLED ORAL AS NEEDED
COMMUNITY

## 2023-05-15 ENCOUNTER — LAB (OUTPATIENT)
Dept: LAB | Facility: HOSPITAL | Age: 63
End: 2023-05-15
Payer: COMMERCIAL

## 2023-05-15 DIAGNOSIS — Z11.59 SCREENING FOR VIRAL DISEASE: ICD-10-CM

## 2023-05-15 DIAGNOSIS — E55.9 VITAMIN D DEFICIENCY: ICD-10-CM

## 2023-05-15 DIAGNOSIS — I10 ESSENTIAL HYPERTENSION: ICD-10-CM

## 2023-05-15 DIAGNOSIS — E78.2 MIXED HYPERLIPIDEMIA: ICD-10-CM

## 2023-05-15 LAB
25(OH)D3 SERPL-MCNC: 35.9 NG/ML (ref 30–100)
ALBUMIN SERPL-MCNC: 4.4 G/DL (ref 3.5–5.2)
ALBUMIN/GLOB SERPL: 2 G/DL
ALP SERPL-CCNC: 86 U/L (ref 39–117)
ALT SERPL W P-5'-P-CCNC: 31 U/L (ref 1–33)
ANION GAP SERPL CALCULATED.3IONS-SCNC: 10.8 MMOL/L (ref 5–15)
AST SERPL-CCNC: 20 U/L (ref 1–32)
BILIRUB SERPL-MCNC: 0.8 MG/DL (ref 0–1.2)
BUN SERPL-MCNC: 16 MG/DL (ref 8–23)
BUN/CREAT SERPL: 15.7 (ref 7–25)
CALCIUM SPEC-SCNC: 9.6 MG/DL (ref 8.6–10.5)
CHLORIDE SERPL-SCNC: 103 MMOL/L (ref 98–107)
CHOLEST SERPL-MCNC: 186 MG/DL (ref 0–200)
CO2 SERPL-SCNC: 23.2 MMOL/L (ref 22–29)
CREAT SERPL-MCNC: 1.02 MG/DL (ref 0.57–1)
EGFRCR SERPLBLD CKD-EPI 2021: 62.3 ML/MIN/1.73
GLOBULIN UR ELPH-MCNC: 2.2 GM/DL
GLUCOSE SERPL-MCNC: 96 MG/DL (ref 65–99)
HCV AB SER DONR QL: NORMAL
HDLC SERPL-MCNC: 53 MG/DL (ref 40–60)
LDLC SERPL CALC-MCNC: 109 MG/DL (ref 0–100)
LDLC/HDLC SERPL: 2 {RATIO}
POTASSIUM SERPL-SCNC: 4.6 MMOL/L (ref 3.5–5.2)
PROT SERPL-MCNC: 6.6 G/DL (ref 6–8.5)
SODIUM SERPL-SCNC: 137 MMOL/L (ref 136–145)
TRIGL SERPL-MCNC: 134 MG/DL (ref 0–150)
VLDLC SERPL-MCNC: 24 MG/DL (ref 5–40)

## 2023-05-15 PROCEDURE — 82306 VITAMIN D 25 HYDROXY: CPT

## 2023-05-15 PROCEDURE — 86803 HEPATITIS C AB TEST: CPT

## 2023-05-15 PROCEDURE — 80053 COMPREHEN METABOLIC PANEL: CPT

## 2023-05-15 PROCEDURE — 80061 LIPID PANEL: CPT

## 2023-05-15 PROCEDURE — 36415 COLL VENOUS BLD VENIPUNCTURE: CPT

## 2023-08-24 DIAGNOSIS — I10 ESSENTIAL HYPERTENSION: ICD-10-CM

## 2023-08-24 RX ORDER — LOSARTAN POTASSIUM 50 MG/1
50 TABLET ORAL DAILY
Qty: 90 TABLET | Refills: 1 | Status: SHIPPED | OUTPATIENT
Start: 2023-08-24

## 2023-09-13 ENCOUNTER — TELEPHONE (OUTPATIENT)
Dept: CARDIOLOGY | Facility: CLINIC | Age: 63
End: 2023-09-13
Payer: COMMERCIAL

## 2023-09-22 ENCOUNTER — TELEPHONE (OUTPATIENT)
Dept: CARDIOLOGY | Facility: CLINIC | Age: 63
End: 2023-09-22
Payer: COMMERCIAL

## 2023-09-29 ENCOUNTER — TELEPHONE (OUTPATIENT)
Dept: CARDIOLOGY | Facility: CLINIC | Age: 63
End: 2023-09-29
Payer: COMMERCIAL

## 2023-09-29 DIAGNOSIS — E78.2 MIXED HYPERLIPIDEMIA: ICD-10-CM

## 2023-09-29 DIAGNOSIS — R94.31 HOLTER MONITOR, ABNORMAL: ICD-10-CM

## 2023-09-29 DIAGNOSIS — I49.3 FREQUENT PVCS: ICD-10-CM

## 2023-09-29 RX ORDER — PRAVASTATIN SODIUM 10 MG
10 TABLET ORAL
Qty: 90 TABLET | Refills: 1 | Status: SHIPPED | OUTPATIENT
Start: 2023-09-29

## 2023-11-09 ENCOUNTER — OFFICE VISIT (OUTPATIENT)
Dept: FAMILY MEDICINE CLINIC | Age: 63
End: 2023-11-09
Payer: COMMERCIAL

## 2023-11-09 ENCOUNTER — LAB (OUTPATIENT)
Dept: LAB | Facility: HOSPITAL | Age: 63
End: 2023-11-09
Payer: COMMERCIAL

## 2023-11-09 VITALS
WEIGHT: 157 LBS | DIASTOLIC BLOOD PRESSURE: 87 MMHG | HEART RATE: 62 BPM | BODY MASS INDEX: 28.89 KG/M2 | OXYGEN SATURATION: 97 % | SYSTOLIC BLOOD PRESSURE: 138 MMHG | HEIGHT: 62 IN

## 2023-11-09 DIAGNOSIS — I10 ESSENTIAL HYPERTENSION: ICD-10-CM

## 2023-11-09 DIAGNOSIS — E55.9 VITAMIN D DEFICIENCY: ICD-10-CM

## 2023-11-09 DIAGNOSIS — E78.2 MIXED HYPERLIPIDEMIA: ICD-10-CM

## 2023-11-09 DIAGNOSIS — Z23 NEED FOR INFLUENZA VACCINATION: Primary | ICD-10-CM

## 2023-11-09 DIAGNOSIS — R05.9 COUGH, UNSPECIFIED TYPE: ICD-10-CM

## 2023-11-09 PROBLEM — Z11.59 SCREENING FOR VIRAL DISEASE: Status: RESOLVED | Noted: 2023-05-10 | Resolved: 2023-11-09

## 2023-11-09 LAB
ALBUMIN SERPL-MCNC: 4.5 G/DL (ref 3.5–5.2)
ALBUMIN/GLOB SERPL: 2 G/DL
ALP SERPL-CCNC: 90 U/L (ref 39–117)
ALT SERPL W P-5'-P-CCNC: 25 U/L (ref 1–33)
ANION GAP SERPL CALCULATED.3IONS-SCNC: 9 MMOL/L (ref 5–15)
AST SERPL-CCNC: 17 U/L (ref 1–32)
BILIRUB SERPL-MCNC: 0.7 MG/DL (ref 0–1.2)
BUN SERPL-MCNC: 14 MG/DL (ref 8–23)
BUN/CREAT SERPL: 15.2 (ref 7–25)
CALCIUM SPEC-SCNC: 9.4 MG/DL (ref 8.6–10.5)
CHLORIDE SERPL-SCNC: 103 MMOL/L (ref 98–107)
CHOLEST SERPL-MCNC: 203 MG/DL (ref 0–200)
CO2 SERPL-SCNC: 24 MMOL/L (ref 22–29)
CREAT SERPL-MCNC: 0.92 MG/DL (ref 0.57–1)
EGFRCR SERPLBLD CKD-EPI 2021: 70.5 ML/MIN/1.73
GLOBULIN UR ELPH-MCNC: 2.3 GM/DL
GLUCOSE SERPL-MCNC: 100 MG/DL (ref 65–99)
HDLC SERPL-MCNC: 52 MG/DL (ref 40–60)
LDLC SERPL CALC-MCNC: 126 MG/DL (ref 0–100)
LDLC/HDLC SERPL: 2.37 {RATIO}
POTASSIUM SERPL-SCNC: 4.4 MMOL/L (ref 3.5–5.2)
PROT SERPL-MCNC: 6.8 G/DL (ref 6–8.5)
SODIUM SERPL-SCNC: 136 MMOL/L (ref 136–145)
TRIGL SERPL-MCNC: 138 MG/DL (ref 0–150)
VLDLC SERPL-MCNC: 25 MG/DL (ref 5–40)

## 2023-11-09 PROCEDURE — 80053 COMPREHEN METABOLIC PANEL: CPT

## 2023-11-09 PROCEDURE — 80061 LIPID PANEL: CPT

## 2023-11-09 PROCEDURE — 36415 COLL VENOUS BLD VENIPUNCTURE: CPT

## 2023-11-09 RX ORDER — LOSARTAN POTASSIUM 50 MG/1
50 TABLET ORAL DAILY
Qty: 90 TABLET | Refills: 1 | Status: SHIPPED | OUTPATIENT
Start: 2023-11-09

## 2023-11-09 RX ORDER — PRAVASTATIN SODIUM 10 MG
10 TABLET ORAL
Qty: 90 TABLET | Refills: 1 | Status: SHIPPED | OUTPATIENT
Start: 2023-11-09

## 2023-12-05 ENCOUNTER — OFFICE VISIT (OUTPATIENT)
Dept: CARDIOLOGY | Facility: CLINIC | Age: 63
End: 2023-12-05
Payer: COMMERCIAL

## 2023-12-05 VITALS
SYSTOLIC BLOOD PRESSURE: 127 MMHG | HEIGHT: 62 IN | BODY MASS INDEX: 29.33 KG/M2 | WEIGHT: 159.4 LBS | DIASTOLIC BLOOD PRESSURE: 86 MMHG | HEART RATE: 50 BPM

## 2023-12-05 DIAGNOSIS — I10 ESSENTIAL HYPERTENSION: ICD-10-CM

## 2023-12-05 DIAGNOSIS — I49.3 FREQUENT PVCS: Primary | ICD-10-CM

## 2023-12-07 PROBLEM — I49.3 FREQUENT PVCS: Status: ACTIVE | Noted: 2023-12-07

## 2024-02-13 DIAGNOSIS — I10 ESSENTIAL HYPERTENSION: ICD-10-CM

## 2024-02-13 DIAGNOSIS — E78.2 MIXED HYPERLIPIDEMIA: ICD-10-CM

## 2024-02-13 RX ORDER — PRAVASTATIN SODIUM 10 MG
10 TABLET ORAL
Qty: 90 TABLET | Refills: 1 | OUTPATIENT
Start: 2024-02-13

## 2024-02-13 RX ORDER — LOSARTAN POTASSIUM 50 MG/1
50 TABLET ORAL DAILY
Qty: 90 TABLET | Refills: 1 | OUTPATIENT
Start: 2024-02-13

## 2024-02-15 ENCOUNTER — PATIENT MESSAGE (OUTPATIENT)
Dept: CARDIOLOGY | Facility: CLINIC | Age: 64
End: 2024-02-15
Payer: COMMERCIAL

## 2024-02-15 DIAGNOSIS — I10 ESSENTIAL HYPERTENSION: ICD-10-CM

## 2024-05-10 ENCOUNTER — TELEPHONE (OUTPATIENT)
Dept: CARDIOLOGY | Facility: CLINIC | Age: 64
End: 2024-05-10
Payer: COMMERCIAL

## 2024-05-10 ENCOUNTER — OFFICE VISIT (OUTPATIENT)
Dept: FAMILY MEDICINE CLINIC | Age: 64
End: 2024-05-10
Payer: COMMERCIAL

## 2024-05-10 ENCOUNTER — LAB (OUTPATIENT)
Dept: LAB | Facility: HOSPITAL | Age: 64
End: 2024-05-10
Payer: COMMERCIAL

## 2024-05-10 VITALS
BODY MASS INDEX: 29.26 KG/M2 | DIASTOLIC BLOOD PRESSURE: 75 MMHG | HEIGHT: 62 IN | HEART RATE: 46 BPM | WEIGHT: 159 LBS | OXYGEN SATURATION: 97 % | SYSTOLIC BLOOD PRESSURE: 140 MMHG

## 2024-05-10 DIAGNOSIS — R00.1 BRADYCARDIA: ICD-10-CM

## 2024-05-10 DIAGNOSIS — R00.1 BRADYCARDIA: Primary | ICD-10-CM

## 2024-05-10 DIAGNOSIS — E78.2 MIXED HYPERLIPIDEMIA: ICD-10-CM

## 2024-05-10 DIAGNOSIS — E55.9 VITAMIN D DEFICIENCY: ICD-10-CM

## 2024-05-10 DIAGNOSIS — I10 ESSENTIAL HYPERTENSION: ICD-10-CM

## 2024-05-10 DIAGNOSIS — I10 ESSENTIAL HYPERTENSION: Primary | ICD-10-CM

## 2024-05-10 PROBLEM — Z23 NEED FOR INFLUENZA VACCINATION: Status: RESOLVED | Noted: 2023-11-09 | Resolved: 2024-05-10

## 2024-05-10 PROBLEM — R05.9 COUGH: Status: RESOLVED | Noted: 2023-11-09 | Resolved: 2024-05-10

## 2024-05-10 PROBLEM — H92.02 LEFT EAR PAIN: Status: RESOLVED | Noted: 2023-05-10 | Resolved: 2024-05-10

## 2024-05-10 LAB
BASOPHILS # BLD AUTO: 0.03 10*3/MM3 (ref 0–0.2)
BASOPHILS NFR BLD AUTO: 0.5 % (ref 0–1.5)
DEPRECATED RDW RBC AUTO: 41.8 FL (ref 37–54)
EOSINOPHIL # BLD AUTO: 0.09 10*3/MM3 (ref 0–0.4)
EOSINOPHIL NFR BLD AUTO: 1.5 % (ref 0.3–6.2)
ERYTHROCYTE [DISTWIDTH] IN BLOOD BY AUTOMATED COUNT: 12.4 % (ref 12.3–15.4)
HCT VFR BLD AUTO: 42.2 % (ref 34–46.6)
HGB BLD-MCNC: 14.3 G/DL (ref 12–15.9)
IMM GRANULOCYTES # BLD AUTO: 0.01 10*3/MM3 (ref 0–0.05)
IMM GRANULOCYTES NFR BLD AUTO: 0.2 % (ref 0–0.5)
LYMPHOCYTES # BLD AUTO: 2.2 10*3/MM3 (ref 0.7–3.1)
LYMPHOCYTES NFR BLD AUTO: 36.3 % (ref 19.6–45.3)
MCH RBC QN AUTO: 30.7 PG (ref 26.6–33)
MCHC RBC AUTO-ENTMCNC: 33.9 G/DL (ref 31.5–35.7)
MCV RBC AUTO: 90.6 FL (ref 79–97)
MONOCYTES # BLD AUTO: 0.54 10*3/MM3 (ref 0.1–0.9)
MONOCYTES NFR BLD AUTO: 8.9 % (ref 5–12)
NEUTROPHILS NFR BLD AUTO: 3.19 10*3/MM3 (ref 1.7–7)
NEUTROPHILS NFR BLD AUTO: 52.6 % (ref 42.7–76)
PLATELET # BLD AUTO: 278 10*3/MM3 (ref 140–450)
PMV BLD AUTO: 9 FL (ref 6–12)
RBC # BLD AUTO: 4.66 10*6/MM3 (ref 3.77–5.28)
WBC NRBC COR # BLD AUTO: 6.06 10*3/MM3 (ref 3.4–10.8)

## 2024-05-10 PROCEDURE — 80050 GENERAL HEALTH PANEL: CPT

## 2024-05-10 PROCEDURE — 36415 COLL VENOUS BLD VENIPUNCTURE: CPT

## 2024-05-10 PROCEDURE — 80061 LIPID PANEL: CPT

## 2024-05-10 PROCEDURE — 83735 ASSAY OF MAGNESIUM: CPT

## 2024-05-10 PROCEDURE — 82306 VITAMIN D 25 HYDROXY: CPT

## 2024-05-10 PROCEDURE — 99214 OFFICE O/P EST MOD 30 MIN: CPT | Performed by: NURSE PRACTITIONER

## 2024-05-10 RX ORDER — PRAVASTATIN SODIUM 10 MG
10 TABLET ORAL
Qty: 90 TABLET | Refills: 1 | Status: SHIPPED | OUTPATIENT
Start: 2024-05-10

## 2024-05-10 RX ORDER — LOSARTAN POTASSIUM 50 MG/1
50 TABLET ORAL DAILY
Qty: 90 TABLET | Refills: 1 | Status: SHIPPED | OUTPATIENT
Start: 2024-05-10

## 2024-05-11 LAB
25(OH)D3 SERPL-MCNC: 39.8 NG/ML (ref 30–100)
ALBUMIN SERPL-MCNC: 4.7 G/DL (ref 3.5–5.2)
ALBUMIN/GLOB SERPL: 2 G/DL
ALP SERPL-CCNC: 81 U/L (ref 39–117)
ALT SERPL W P-5'-P-CCNC: 22 U/L (ref 1–33)
ANION GAP SERPL CALCULATED.3IONS-SCNC: 11 MMOL/L (ref 5–15)
AST SERPL-CCNC: 16 U/L (ref 1–32)
BILIRUB SERPL-MCNC: 0.9 MG/DL (ref 0–1.2)
BUN SERPL-MCNC: 12 MG/DL (ref 8–23)
BUN/CREAT SERPL: 11.5 (ref 7–25)
CALCIUM SPEC-SCNC: 9.3 MG/DL (ref 8.6–10.5)
CHLORIDE SERPL-SCNC: 102 MMOL/L (ref 98–107)
CHOLEST SERPL-MCNC: 174 MG/DL (ref 0–200)
CO2 SERPL-SCNC: 24 MMOL/L (ref 22–29)
CREAT SERPL-MCNC: 1.04 MG/DL (ref 0.57–1)
EGFRCR SERPLBLD CKD-EPI 2021: 60.5 ML/MIN/1.73
GLOBULIN UR ELPH-MCNC: 2.4 GM/DL
GLUCOSE SERPL-MCNC: 99 MG/DL (ref 65–99)
HDLC SERPL-MCNC: 53 MG/DL (ref 40–60)
LDLC SERPL CALC-MCNC: 98 MG/DL (ref 0–100)
LDLC/HDLC SERPL: 1.79 {RATIO}
MAGNESIUM SERPL-MCNC: 2.1 MG/DL (ref 1.6–2.4)
POTASSIUM SERPL-SCNC: 4.7 MMOL/L (ref 3.5–5.2)
PROT SERPL-MCNC: 7.1 G/DL (ref 6–8.5)
SODIUM SERPL-SCNC: 137 MMOL/L (ref 136–145)
TRIGL SERPL-MCNC: 130 MG/DL (ref 0–150)
TSH SERPL DL<=0.05 MIU/L-ACNC: 2.88 UIU/ML (ref 0.27–4.2)
VLDLC SERPL-MCNC: 23 MG/DL (ref 5–40)

## 2024-06-04 ENCOUNTER — PATIENT MESSAGE (OUTPATIENT)
Dept: CARDIOLOGY | Facility: CLINIC | Age: 64
End: 2024-06-04
Payer: COMMERCIAL

## 2024-06-13 RX ORDER — METOPROLOL SUCCINATE 50 MG/1
50 TABLET, EXTENDED RELEASE ORAL DAILY
Qty: 30 TABLET | Refills: 1 | Status: SHIPPED | OUTPATIENT
Start: 2024-06-13

## 2024-07-02 ENCOUNTER — OFFICE VISIT (OUTPATIENT)
Dept: CARDIOLOGY | Facility: CLINIC | Age: 64
End: 2024-07-02
Payer: COMMERCIAL

## 2024-07-02 VITALS
SYSTOLIC BLOOD PRESSURE: 130 MMHG | HEIGHT: 62 IN | WEIGHT: 153.8 LBS | BODY MASS INDEX: 28.3 KG/M2 | HEART RATE: 72 BPM | DIASTOLIC BLOOD PRESSURE: 69 MMHG

## 2024-07-02 DIAGNOSIS — I49.3 FREQUENT PVCS: Primary | ICD-10-CM

## 2024-07-02 DIAGNOSIS — I10 ESSENTIAL HYPERTENSION: ICD-10-CM

## 2024-07-02 PROCEDURE — 99213 OFFICE O/P EST LOW 20 MIN: CPT | Performed by: FAMILY MEDICINE

## 2024-07-26 ENCOUNTER — HOSPITAL ENCOUNTER (OUTPATIENT)
Dept: CARDIOLOGY | Facility: HOSPITAL | Age: 64
Discharge: HOME | End: 2024-07-26
Admitting: FAMILY MEDICINE
Payer: COMMERCIAL

## 2024-07-26 DIAGNOSIS — I49.3 FREQUENT PVCS: ICD-10-CM

## 2024-07-26 LAB
AORTIC DIMENSIONLESS INDEX: 0.45 (DI)
ASCENDING AORTA: 3.2 CM
BH CV ECHO MEAS - AO MAX PG: 20.4 MMHG
BH CV ECHO MEAS - AO MEAN PG: 8.1 MMHG
BH CV ECHO MEAS - AO ROOT DIAM: 3.2 CM
BH CV ECHO MEAS - AO V2 MAX: 225.9 CM/SEC
BH CV ECHO MEAS - AO V2 VTI: 42.1 CM
BH CV ECHO MEAS - AVA(I,D): 1.6 CM2
BH CV ECHO MEAS - EDV(CUBED): 128.4 ML
BH CV ECHO MEAS - EDV(MOD-SP2): 75.7 ML
BH CV ECHO MEAS - EDV(MOD-SP4): 95.3 ML
BH CV ECHO MEAS - EF(MOD-SP2): 51.9 %
BH CV ECHO MEAS - EF(MOD-SP4): 50.3 %
BH CV ECHO MEAS - ESV(CUBED): 35.3 ML
BH CV ECHO MEAS - ESV(MOD-SP2): 36.4 ML
BH CV ECHO MEAS - ESV(MOD-SP4): 47.4 ML
BH CV ECHO MEAS - FS: 35 %
BH CV ECHO MEAS - IVS/LVPW: 0.95 CM
BH CV ECHO MEAS - IVSD: 0.99 CM
BH CV ECHO MEAS - LA DIMENSION: 3.8 CM
BH CV ECHO MEAS - LV DIASTOLIC VOL/BSA (35-75): 55.9 CM2
BH CV ECHO MEAS - LV MASS(C)D: 188.8 GRAMS
BH CV ECHO MEAS - LV MAX PG: 3.8 MMHG
BH CV ECHO MEAS - LV MEAN PG: 2.06 MMHG
BH CV ECHO MEAS - LV SYSTOLIC VOL/BSA (12-30): 27.8 CM2
BH CV ECHO MEAS - LV V1 MAX: 97.5 CM/SEC
BH CV ECHO MEAS - LV V1 VTI: 19.1 CM
BH CV ECHO MEAS - LVIDD: 5 CM
BH CV ECHO MEAS - LVIDS: 3.3 CM
BH CV ECHO MEAS - LVOT AREA: 3.7 CM2
BH CV ECHO MEAS - LVOT DIAM: 2.16 CM
BH CV ECHO MEAS - LVPWD: 1.04 CM
BH CV ECHO MEAS - MV A MAX VEL: 112.6 CM/SEC
BH CV ECHO MEAS - MV DEC TIME: 0.24 SEC
BH CV ECHO MEAS - MV E MAX VEL: 62 CM/SEC
BH CV ECHO MEAS - MV E/A: 0.55
BH CV ECHO MEAS - PA V2 MAX: 101 CM/SEC
BH CV ECHO MEAS - RVDD: 3.2 CM
BH CV ECHO MEAS - SV(LVOT): 70.2 ML
BH CV ECHO MEAS - SV(MOD-SP2): 39.3 ML
BH CV ECHO MEAS - SV(MOD-SP4): 47.9 ML
BH CV ECHO MEAS - SVI(LVOT): 41.2 ML/M2
BH CV ECHO MEAS - SVI(MOD-SP2): 23 ML/M2
BH CV ECHO MEAS - SVI(MOD-SP4): 28.1 ML/M2
BH CV ECHO MEAS - TAPSE (>1.6): 2.39 CM
BH CV XLRA - TDI S': 20.7 CM/SEC
IVRT: 108 MS
LEFT ATRIUM VOLUME INDEX: 26 ML/M2

## 2024-07-26 PROCEDURE — 93306 TTE W/DOPPLER COMPLETE: CPT

## 2024-07-28 DIAGNOSIS — I31.39 PERICARDIAL EFFUSION: Primary | ICD-10-CM

## 2024-07-29 ENCOUNTER — TELEPHONE (OUTPATIENT)
Dept: CARDIOLOGY | Facility: CLINIC | Age: 64
End: 2024-07-29
Payer: COMMERCIAL

## 2024-08-06 RX ORDER — METOPROLOL SUCCINATE 50 MG/1
50 TABLET, EXTENDED RELEASE ORAL DAILY
Qty: 30 TABLET | Refills: 1 | Status: SHIPPED | OUTPATIENT
Start: 2024-08-06

## 2024-08-29 ENCOUNTER — OFFICE VISIT (OUTPATIENT)
Age: 64
End: 2024-08-29
Payer: COMMERCIAL

## 2024-08-29 VITALS
HEIGHT: 62 IN | HEART RATE: 60 BPM | BODY MASS INDEX: 27.79 KG/M2 | SYSTOLIC BLOOD PRESSURE: 138 MMHG | DIASTOLIC BLOOD PRESSURE: 94 MMHG | WEIGHT: 151 LBS

## 2024-08-29 DIAGNOSIS — I49.3 PVC (PREMATURE VENTRICULAR CONTRACTION): Primary | ICD-10-CM

## 2024-08-29 DIAGNOSIS — I49.3 FREQUENT PVCS: ICD-10-CM

## 2024-08-29 PROCEDURE — 93000 ELECTROCARDIOGRAM COMPLETE: CPT | Performed by: INTERNAL MEDICINE

## 2024-08-29 PROCEDURE — 99213 OFFICE O/P EST LOW 20 MIN: CPT | Performed by: INTERNAL MEDICINE

## 2024-09-09 ENCOUNTER — HOSPITAL ENCOUNTER (OUTPATIENT)
Dept: CARDIOLOGY | Facility: HOSPITAL | Age: 64
Discharge: HOME | End: 2024-09-09
Admitting: FAMILY MEDICINE
Payer: COMMERCIAL

## 2024-09-09 DIAGNOSIS — I31.39 PERICARDIAL EFFUSION: ICD-10-CM

## 2024-09-09 PROCEDURE — 93308 TTE F-UP OR LMTD: CPT

## 2024-09-09 PROCEDURE — 93321 DOPPLER ECHO F-UP/LMTD STD: CPT

## 2024-09-13 ENCOUNTER — TELEPHONE (OUTPATIENT)
Dept: CARDIOLOGY | Facility: CLINIC | Age: 64
End: 2024-09-13
Payer: COMMERCIAL

## 2024-10-04 ENCOUNTER — PATIENT MESSAGE (OUTPATIENT)
Dept: CARDIOLOGY | Facility: CLINIC | Age: 64
End: 2024-10-04
Payer: COMMERCIAL

## 2024-10-04 RX ORDER — METOPROLOL SUCCINATE 50 MG/1
50 TABLET, EXTENDED RELEASE ORAL DAILY
Qty: 90 TABLET | Refills: 0 | Status: SHIPPED | OUTPATIENT
Start: 2024-10-04 | End: 2024-10-05 | Stop reason: SDUPTHER

## 2024-10-07 RX ORDER — METOPROLOL SUCCINATE 50 MG/1
50 TABLET, EXTENDED RELEASE ORAL DAILY
Qty: 90 TABLET | Refills: 0 | Status: SHIPPED | OUTPATIENT
Start: 2024-10-07

## 2024-11-12 ENCOUNTER — OFFICE VISIT (OUTPATIENT)
Dept: FAMILY MEDICINE CLINIC | Age: 64
End: 2024-11-12
Payer: COMMERCIAL

## 2024-11-12 ENCOUNTER — LAB (OUTPATIENT)
Dept: LAB | Facility: HOSPITAL | Age: 64
End: 2024-11-12
Payer: COMMERCIAL

## 2024-11-12 VITALS
TEMPERATURE: 97.2 F | SYSTOLIC BLOOD PRESSURE: 134 MMHG | HEIGHT: 62 IN | BODY MASS INDEX: 28.16 KG/M2 | OXYGEN SATURATION: 98 % | HEART RATE: 61 BPM | DIASTOLIC BLOOD PRESSURE: 86 MMHG | WEIGHT: 153 LBS

## 2024-11-12 DIAGNOSIS — Z12.31 BREAST CANCER SCREENING BY MAMMOGRAM: Primary | ICD-10-CM

## 2024-11-12 DIAGNOSIS — E78.2 MIXED HYPERLIPIDEMIA: ICD-10-CM

## 2024-11-12 DIAGNOSIS — I10 ESSENTIAL HYPERTENSION: ICD-10-CM

## 2024-11-12 LAB
ALBUMIN SERPL-MCNC: 4.5 G/DL (ref 3.5–5.2)
ALBUMIN/GLOB SERPL: 2 G/DL
ALP SERPL-CCNC: 102 U/L (ref 39–117)
ALT SERPL W P-5'-P-CCNC: 46 U/L (ref 1–33)
ANION GAP SERPL CALCULATED.3IONS-SCNC: 12 MMOL/L (ref 5–15)
AST SERPL-CCNC: 27 U/L (ref 1–32)
BILIRUB SERPL-MCNC: 0.8 MG/DL (ref 0–1.2)
BUN SERPL-MCNC: 15 MG/DL (ref 8–23)
BUN/CREAT SERPL: 16.5 (ref 7–25)
CALCIUM SPEC-SCNC: 9.5 MG/DL (ref 8.6–10.5)
CHLORIDE SERPL-SCNC: 98 MMOL/L (ref 98–107)
CHOLEST SERPL-MCNC: 191 MG/DL (ref 0–200)
CO2 SERPL-SCNC: 22 MMOL/L (ref 22–29)
CREAT SERPL-MCNC: 0.91 MG/DL (ref 0.57–1)
EGFRCR SERPLBLD CKD-EPI 2021: 71 ML/MIN/1.73
GLOBULIN UR ELPH-MCNC: 2.3 GM/DL
GLUCOSE SERPL-MCNC: 92 MG/DL (ref 65–99)
HDLC SERPL-MCNC: 54 MG/DL (ref 40–60)
LDLC SERPL CALC-MCNC: 118 MG/DL (ref 0–100)
LDLC/HDLC SERPL: 2.14 {RATIO}
POTASSIUM SERPL-SCNC: 4.6 MMOL/L (ref 3.5–5.2)
PROT SERPL-MCNC: 6.8 G/DL (ref 6–8.5)
SODIUM SERPL-SCNC: 132 MMOL/L (ref 136–145)
TRIGL SERPL-MCNC: 108 MG/DL (ref 0–150)
VLDLC SERPL-MCNC: 19 MG/DL (ref 5–40)

## 2024-11-12 PROCEDURE — 36415 COLL VENOUS BLD VENIPUNCTURE: CPT

## 2024-11-12 PROCEDURE — 80053 COMPREHEN METABOLIC PANEL: CPT

## 2024-11-12 PROCEDURE — 80061 LIPID PANEL: CPT

## 2024-11-12 RX ORDER — LOSARTAN POTASSIUM 50 MG/1
50 TABLET ORAL DAILY
Qty: 90 TABLET | Refills: 1 | Status: SHIPPED | OUTPATIENT
Start: 2024-11-12

## 2024-11-12 RX ORDER — PRAVASTATIN SODIUM 10 MG
10 TABLET ORAL
Qty: 90 TABLET | Refills: 1 | Status: SHIPPED | OUTPATIENT
Start: 2024-11-12

## 2024-11-12 NOTE — PROGRESS NOTES
"Chief Complaint  Hyperlipidemia (6 month follow up ), Hypertension, and Vitamin D Deficiency    Subjective          Lluvia Becerra presents to [facility]     Patient is a 63-year-old female here today to follow-up regarding hyperlipidemia.  She is taking pravastatin 10 mg at bedtime.  Denies side effects and requests refills.    Cardiology manages PVCs and she continues to take metoprolol 50 mg daily.  Patient denies any episode of low heart rate.    This office prescribes losartan 50 mg daily for hypertension.  Patient reports blood pressures under good control.  Denies side effects and requests refills.    Patient has never had an abnormal Pap smear.  It has been high while in longer than 3 years since her last Pap smear.  Will perform Pap smear with her next appointment.    Recommend tetanus booster once every 10 years and may receive here at the pharmacy.     Objective   Vital Signs:   Vitals:    11/12/24 1042 11/12/24 1112   BP: 145/91 134/86   BP Location: Left arm Right arm   Patient Position: Sitting Sitting   Cuff Size: Adult Adult   Pulse: 61    Temp: 97.2 °F (36.2 °C)    TempSrc: Temporal    SpO2: 98%    Weight: 69.4 kg (153 lb)    Height: 157.5 cm (62\")        Wt Readings from Last 3 Encounters:   11/12/24 69.4 kg (153 lb)   08/29/24 68.5 kg (151 lb)   07/02/24 69.8 kg (153 lb 12.8 oz)      BP Readings from Last 3 Encounters:   11/12/24 134/86   08/29/24 138/94   07/02/24 130/69       Body mass index is 27.98 kg/m².             Physical Exam  Vitals reviewed.   Constitutional:       General: She is not in acute distress.     Appearance: Normal appearance. She is well-developed.   Neck:      Thyroid: No thyromegaly.      Vascular: No carotid bruit.   Cardiovascular:      Rate and Rhythm: Normal rate and regular rhythm.      Pulses:           Posterior tibial pulses are 2+ on the right side and 2+ on the left side.      Heart sounds: Normal heart sounds.   Pulmonary: "      Effort: Pulmonary effort is normal.      Breath sounds: Normal breath sounds.   Musculoskeletal:      Right lower leg: No edema.      Left lower leg: No edema.   Skin:     General: Skin is warm and dry.   Neurological:      General: No focal deficit present.      Mental Status: She is alert.   Psychiatric:         Attention and Perception: Attention normal.         Mood and Affect: Mood and affect normal.         Behavior: Behavior normal.           Current Outpatient Medications:     Loratadine (Claritin) 10 MG capsule, Take 1 capsule by mouth As Needed., Disp: , Rfl:     losartan (COZAAR) 50 MG tablet, Take 1 tablet by mouth Daily., Disp: 90 tablet, Rfl: 1    Melatonin 10 MG tablet, Take 1 tablet by mouth Every Night., Disp: , Rfl:     metoprolol succinate XL (TOPROL-XL) 50 MG 24 hr tablet, Take 1 tablet by mouth Daily., Disp: 90 tablet, Rfl: 0    pravastatin (PRAVACHOL) 10 MG tablet, Take 1 tablet by mouth every night at bedtime., Disp: 90 tablet, Rfl: 1    VITAMIN D PO, Take  by mouth., Disp: , Rfl:    Past Medical History:   Diagnosis Date    Allergic     Colon polyp     Headache     Hyperlipidemia     Hypertension     Ventricular tachycardia      No Known Allergies            Result Review :     Common labs          5/10/2024    11:19   Common Labs   Glucose 99    BUN 12    Creatinine 1.04    Sodium 137    Potassium 4.7    Chloride 102    Calcium 9.3    Albumin 4.7    Total Bilirubin 0.9    Alkaline Phosphatase 81    AST (SGOT) 16    ALT (SGPT) 22    WBC 6.06    Hemoglobin 14.3    Hematocrit 42.2    Platelets 278    Total Cholesterol 174    Triglycerides 130    HDL Cholesterol 53    LDL Cholesterol  98         No Images in the past 120 days found..           Social History     Tobacco Use   Smoking Status Former    Current packs/day: 0.25    Average packs/day: 0.3 packs/day for 1.2 years (0.3 ttl pk-yrs)    Types: Cigarettes   Smokeless Tobacco Never   Tobacco Comments    Only in high school            Assessment and Plan    Diagnoses and all orders for this visit:    1. Breast cancer screening by mammogram (Primary)  -     Mammo Screening Digital Tomosynthesis Bilateral With CAD; Future    2. Mixed hyperlipidemia  -     Comprehensive metabolic panel; Future  -     Lipid panel; Future  -     pravastatin (PRAVACHOL) 10 MG tablet; Take 1 tablet by mouth every night at bedtime.  Dispense: 90 tablet; Refill: 1    3. Essential hypertension  Assessment & Plan:  Monitor blood pressure at home reporting elevated readings.  Follow-up in 6 months or sooner if concerns.  Further treatment recommendations pending lab results.    Orders:  -     Comprehensive metabolic panel; Future  -     Lipid panel; Future  -     losartan (COZAAR) 50 MG tablet; Take 1 tablet by mouth Daily.  Dispense: 90 tablet; Refill: 1        Follow Up    No follow-ups on file.  Patient was given instructions and counseling regarding her condition or for health maintenance advice. Please see specific information pulled into the AVS if appropriate.

## 2024-11-18 DIAGNOSIS — E87.1 HYPONATREMIA: Primary | ICD-10-CM

## 2024-11-18 DIAGNOSIS — R74.01 ELEVATED ALT MEASUREMENT: ICD-10-CM

## 2024-11-18 NOTE — PROGRESS NOTES
LDL is mildly elevated and it was normal 6 months ago.  You take pravastatin 10 mg at bedtime.  Please let me know if increase in LDL level is not related with change in diet or exercise.  Pravastatin dose could be increased or you can work on diet and exercise.  Sodium is a little low.  This could be due to excessive fluid intake -  do not drink more than 64 oz fluid daily and will recheck sodium in one month with urinalysis to recheck and further investigate.  You are not on medications that would contribute to low sodium (such as diuretics or water pills).  ALT, AST, alkaline phosphatase and bilirubin tell about liver function.  ALT is mildly elevated.  Could be due to recent illness, high fat diet, long term use of ibuprofen or aleve.  I am not concerned at this point.  Will recheck in one month with sodium.  Kidney function and blood sugar are normal.

## 2025-01-02 ENCOUNTER — TELEPHONE (OUTPATIENT)
Dept: FAMILY MEDICINE CLINIC | Age: 65
End: 2025-01-02
Payer: COMMERCIAL

## 2025-01-02 ENCOUNTER — PATIENT MESSAGE (OUTPATIENT)
Dept: FAMILY MEDICINE CLINIC | Age: 65
End: 2025-01-02
Payer: COMMERCIAL

## 2025-01-16 ENCOUNTER — LAB (OUTPATIENT)
Dept: LAB | Facility: HOSPITAL | Age: 65
End: 2025-01-16
Payer: COMMERCIAL

## 2025-01-16 DIAGNOSIS — E87.1 HYPONATREMIA: ICD-10-CM

## 2025-01-16 DIAGNOSIS — R74.01 ELEVATED ALT MEASUREMENT: ICD-10-CM

## 2025-01-16 LAB
ALBUMIN SERPL-MCNC: 4.2 G/DL (ref 3.5–5.2)
ALBUMIN/GLOB SERPL: 1.6 G/DL
ALP SERPL-CCNC: 101 U/L (ref 39–117)
ALT SERPL W P-5'-P-CCNC: 36 U/L (ref 1–33)
ANION GAP SERPL CALCULATED.3IONS-SCNC: 12 MMOL/L (ref 5–15)
AST SERPL-CCNC: 25 U/L (ref 1–32)
BACTERIA UR QL AUTO: ABNORMAL /HPF
BILIRUB SERPL-MCNC: 0.6 MG/DL (ref 0–1.2)
BILIRUB UR QL STRIP: NEGATIVE
BUN SERPL-MCNC: 14 MG/DL (ref 8–23)
BUN/CREAT SERPL: 15.6 (ref 7–25)
CALCIUM SPEC-SCNC: 9.2 MG/DL (ref 8.6–10.5)
CHLORIDE SERPL-SCNC: 102 MMOL/L (ref 98–107)
CLARITY UR: ABNORMAL
CO2 SERPL-SCNC: 24 MMOL/L (ref 22–29)
COLOR UR: YELLOW
CREAT SERPL-MCNC: 0.9 MG/DL (ref 0.57–1)
EGFRCR SERPLBLD CKD-EPI 2021: 71.5 ML/MIN/1.73
GLOBULIN UR ELPH-MCNC: 2.6 GM/DL
GLUCOSE SERPL-MCNC: 92 MG/DL (ref 65–99)
GLUCOSE UR STRIP-MCNC: NEGATIVE MG/DL
HGB UR QL STRIP.AUTO: NEGATIVE
KETONES UR QL STRIP: NEGATIVE
LEUKOCYTE ESTERASE UR QL STRIP.AUTO: ABNORMAL
NITRITE UR QL STRIP: NEGATIVE
OSMOLALITY SERPL: 282 MOSM/KG (ref 280–301)
PH UR STRIP.AUTO: 7 [PH] (ref 5–8)
POTASSIUM SERPL-SCNC: 4.4 MMOL/L (ref 3.5–5.2)
PROT SERPL-MCNC: 6.8 G/DL (ref 6–8.5)
PROT UR QL STRIP: NEGATIVE
RBC # UR STRIP: ABNORMAL /HPF
REF LAB TEST METHOD: ABNORMAL
SODIUM SERPL-SCNC: 138 MMOL/L (ref 136–145)
SP GR UR STRIP: 1.02 (ref 1–1.03)
SQUAMOUS #/AREA URNS HPF: ABNORMAL /HPF
UROBILINOGEN UR QL STRIP: ABNORMAL
WBC # UR STRIP: ABNORMAL /HPF
YEAST URNS QL MICRO: ABNORMAL /HPF

## 2025-01-16 PROCEDURE — 87086 URINE CULTURE/COLONY COUNT: CPT

## 2025-01-16 PROCEDURE — 36415 COLL VENOUS BLD VENIPUNCTURE: CPT

## 2025-01-16 PROCEDURE — 82024 ASSAY OF ACTH: CPT

## 2025-01-16 PROCEDURE — 83930 ASSAY OF BLOOD OSMOLALITY: CPT

## 2025-01-16 PROCEDURE — 81001 URINALYSIS AUTO W/SCOPE: CPT

## 2025-01-16 PROCEDURE — 80053 COMPREHEN METABOLIC PANEL: CPT

## 2025-01-17 DIAGNOSIS — B37.9 YEAST DETECTED: Primary | ICD-10-CM

## 2025-01-17 LAB — ACTH PLAS-MCNC: 30.2 PG/ML (ref 7.2–63.3)

## 2025-01-17 RX ORDER — FLUCONAZOLE 150 MG/1
150 TABLET ORAL ONCE
Qty: 1 TABLET | Refills: 0 | Status: SHIPPED | OUTPATIENT
Start: 2025-01-17 | End: 2025-01-17

## 2025-01-17 NOTE — PROGRESS NOTES
Diflucan sent to [facility].   All other lab resutls normal.  No sign of urinary tract infection.

## 2025-01-17 NOTE — PROGRESS NOTES
Please call patient.  Not all lab results are back yet.  While urine culture is pending the initial urinalysis shows she had some yeast present.  I recommend treating her for a yeast infection if she is agreeable.

## 2025-01-18 LAB — BACTERIA SPEC AEROBE CULT: NO GROWTH

## 2025-01-27 DIAGNOSIS — N76.0 ACUTE VAGINITIS: Primary | ICD-10-CM

## 2025-01-27 RX ORDER — FLUCONAZOLE 150 MG/1
150 TABLET ORAL ONCE
Qty: 1 TABLET | Refills: 0 | Status: SHIPPED | OUTPATIENT
Start: 2025-01-27 | End: 2025-01-27

## 2025-02-07 ENCOUNTER — HOSPITAL ENCOUNTER (OUTPATIENT)
Dept: MAMMOGRAPHY | Facility: HOSPITAL | Age: 65
Discharge: HOME | End: 2025-02-07
Admitting: NURSE PRACTITIONER
Payer: COMMERCIAL

## 2025-02-07 DIAGNOSIS — Z12.31 BREAST CANCER SCREENING BY MAMMOGRAM: ICD-10-CM

## 2025-02-07 PROCEDURE — 77063 BREAST TOMOSYNTHESIS BI: CPT

## 2025-02-07 PROCEDURE — 77067 SCR MAMMO BI INCL CAD: CPT

## 2025-02-10 NOTE — PROGRESS NOTES
Your recent mammogram is negative/ normal.  Please continue monthly self breast exams and report any changes

## 2025-02-12 ENCOUNTER — OFFICE VISIT (OUTPATIENT)
Age: 65
End: 2025-02-12
Payer: COMMERCIAL

## 2025-02-12 VITALS
HEIGHT: 62 IN | DIASTOLIC BLOOD PRESSURE: 83 MMHG | BODY MASS INDEX: 27.6 KG/M2 | HEART RATE: 67 BPM | WEIGHT: 150 LBS | SYSTOLIC BLOOD PRESSURE: 123 MMHG

## 2025-02-12 DIAGNOSIS — I49.3 FREQUENT PVCS: Primary | ICD-10-CM

## 2025-02-12 DIAGNOSIS — E78.2 MIXED HYPERLIPIDEMIA: ICD-10-CM

## 2025-02-12 PROCEDURE — 99214 OFFICE O/P EST MOD 30 MIN: CPT | Performed by: INTERNAL MEDICINE

## 2025-02-12 RX ORDER — PRAVASTATIN SODIUM 20 MG
20 TABLET ORAL
Qty: 90 TABLET | Refills: 3 | Status: SHIPPED | OUTPATIENT
Start: 2025-02-12

## 2025-02-12 RX ORDER — METOPROLOL SUCCINATE 50 MG/1
50 TABLET, EXTENDED RELEASE ORAL DAILY
Qty: 90 TABLET | Refills: 3 | Status: SHIPPED | OUTPATIENT
Start: 2025-02-12

## 2025-02-12 NOTE — PROGRESS NOTES
CARDIOLOGY FOLLOW-UP PROGRESS NOTE        Chief Complaint  Follow-up and Palpitations    Subjective            Lluvia Becerra presents to [facility]  History of Present Illness      Ms. Becerra is here for annual follow-up visit.  She was previously following up with Dr. Frankie Slaughter, who moved to another practice.  Since last office visit with us, he was seen in cardiac electrophysiology clinic.  After discussion, it was decided to continue with conservative management.    Overall she feels fine and at her baseline.  She is fairly active.  She occasionally feels palpitations which are somewhat random.  Mild dizziness present.  No major shortness of breath, chest pain or syncopal episodes.      Past History:    Frequent PVCs : 28% PVC burden per echocardiogram done in May, 2024    Medical History:  Past Medical History:   Diagnosis Date    Allergic     Colon polyp     Headache     Hyperlipidemia     Hypertension        Family History: family history includes Anxiety disorder in her daughter; COPD in her mother; Hearing loss in her mother; Heart attack in her paternal grandfather.     Social History: reports that she has quit smoking. Her smoking use included cigarettes. She has a 0.3 pack-year smoking history. She has never used smokeless tobacco. She reports that she does not currently use alcohol. She reports that she does not use drugs.    Allergies: Patient has no known allergies.    Current Outpatient Medications on File Prior to Visit   Medication Sig    Loratadine (Claritin) 10 MG capsule Take 1 capsule by mouth As Needed.    losartan (COZAAR) 50 MG tablet Take 1 tablet by mouth Daily.    Melatonin 10 MG tablet Take 1 tablet by mouth Every Night.    VITAMIN D PO Take  by mouth.    metoprolol succinate XL (TOPROL-XL) 50 MG 24 hr tablet Take 1 tablet by mouth Daily.    pravastatin (PRAVACHOL) 10 MG tablet Take 1 tablet by mouth every night at bedtime.       Review of Systems  "  Respiratory:  Negative for cough, shortness of breath and wheezing.    Cardiovascular:  Positive for palpitations. Negative for chest pain and leg swelling.   Gastrointestinal:  Negative for nausea and vomiting.   Neurological:  Negative for dizziness and syncope.        Objective     /83   Pulse 67   Ht 157.5 cm (62\")   Wt 68 kg (150 lb)   BMI 27.44 kg/m²       Physical Exam    General : Alert, awake, no acute distress  Neck : Supple, no carotid bruit, no jugular venous distention  CVS : Regular rate and rhythm, no murmur, rubs or gallops  Lungs: Clear to auscultation bilaterally, no crackles or rhonchi  Abdomen: Soft, nontender, bowel sounds heard in all 4 quadrants  Extremities: Warm, well-perfused, no pedal edema    Result Review :     The following data was reviewed by: Collin León MD on 02/12/2025:    CMP          5/10/2024    11:19 11/12/2024    11:22 1/16/2025    08:50   CMP   Glucose 99  92  92    BUN 12  15  14    Creatinine 1.04  0.91  0.90    EGFR 60.5  71.0  71.5    Sodium 137  132  138    Potassium 4.7  4.6  4.4    Chloride 102  98  102    Calcium 9.3  9.5  9.2    Total Protein 7.1  6.8  6.8    Albumin 4.7  4.5  4.2    Globulin 2.4  2.3  2.6    Total Bilirubin 0.9  0.8  0.6    Alkaline Phosphatase 81  102  101    AST (SGOT) 16  27  25    ALT (SGPT) 22  46  36    Albumin/Globulin Ratio 2.0  2.0  1.6    BUN/Creatinine Ratio 11.5  16.5  15.6    Anion Gap 11.0  12.0  12.0      CBC          5/10/2024    11:19   CBC   WBC 6.06    RBC 4.66    Hemoglobin 14.3    Hematocrit 42.2    MCV 90.6    MCH 30.7    MCHC 33.9    RDW 12.4    Platelets 278      TSH          5/10/2024    11:19   TSH   TSH 2.880      Lipid Panel          5/10/2024    11:19 11/12/2024    11:22   Lipid Panel   Total Cholesterol 174  191    Triglycerides 130  108    HDL Cholesterol 53  54    VLDL Cholesterol 23  19    LDL Cholesterol  98  118    LDL/HDL Ratio 1.79  2.14           Data reviewed: Cardiology studies        Results " for orders placed during the hospital encounter of 09/09/24    Adult Transthoracic Echo Limited W/ Cont if Necessary Per Protocol    Interpretation Summary    Left ventricular systolic function is normal. Left ventricular ejection fraction appears to be 51 - 55%.    There is no pericardial effusion noted on the study.    This is a limited study to reevaluate pericardial effusion.  Valvular function not assessed.      Results for orders placed in visit on 08/24/22    Treadmill Stress Test    Interpretation Summary  Negative treadmill stress test for ischemia per electrocardiographic criteria.  No evidence of inducible angina.  Calculated Duke Treadmill Score = 4.9 (low risk).  Very frequent PVCs, ventricular couplets, and runs of ventricular bigeminy were observed during the stress test and the recovery period, but there was no evidence of arrhythmias.  Mildly reduced exercise tolerance for age.               Assessment and Plan        Diagnoses and all orders for this visit:    1. Frequent PVCs (Primary)  Assessment & Plan:  No significant ectopy burden on auscultation today.  She was seen by cardiac electrophysiology last year, recommended conservative management for now.  Continue metoprolol.  Will repeat echocardiogram in August of this year to reevaluate LV function.    Orders:  -     metoprolol succinate XL (TOPROL-XL) 50 MG 24 hr tablet; Take 1 tablet by mouth Daily.  Dispense: 90 tablet; Refill: 3  -     Adult Transthoracic Echo Complete W/ Cont if Necessary Per Protocol; Future    2. Mixed hyperlipidemia  Assessment & Plan:  Most recent LDL is 118, which is above goal.  She has been on same dose of pravastatin for the past several years.  Options were discussed.  We will increase dose of pravastatin to 20 mg nightly.    Orders:  -     pravastatin (PRAVACHOL) 20 MG tablet; Take 1 tablet by mouth every night at bedtime.  Dispense: 90 tablet; Refill: 3              Follow Up     Return in about 1 year (around  2/12/2026) for Next scheduled follow up.    Patient was given instructions and counseling regarding her condition or for health maintenance advice. Please see specific information pulled into the AVS if appropriate.

## 2025-02-12 NOTE — ASSESSMENT & PLAN NOTE
No significant ectopy burden on auscultation today.  She was seen by cardiac electrophysiology last year, recommended conservative management for now.  Continue metoprolol.  Will repeat echocardiogram in August of this year to reevaluate LV function.

## 2025-02-12 NOTE — ASSESSMENT & PLAN NOTE
Most recent LDL is 118, which is above goal.  She has been on same dose of pravastatin for the past several years.  Options were discussed.  We will increase dose of pravastatin to 20 mg nightly.

## 2025-05-07 DIAGNOSIS — I10 ESSENTIAL HYPERTENSION: ICD-10-CM

## 2025-05-07 RX ORDER — LOSARTAN POTASSIUM 50 MG/1
50 TABLET ORAL DAILY
Qty: 90 TABLET | Refills: 1 | Status: SHIPPED | OUTPATIENT
Start: 2025-05-07

## 2025-05-13 ENCOUNTER — OFFICE VISIT (OUTPATIENT)
Dept: FAMILY MEDICINE CLINIC | Age: 65
End: 2025-05-13
Payer: COMMERCIAL

## 2025-05-13 VITALS
OXYGEN SATURATION: 97 % | HEIGHT: 62 IN | DIASTOLIC BLOOD PRESSURE: 78 MMHG | TEMPERATURE: 97.8 F | HEART RATE: 72 BPM | BODY MASS INDEX: 29.11 KG/M2 | SYSTOLIC BLOOD PRESSURE: 128 MMHG | WEIGHT: 158.2 LBS

## 2025-05-13 DIAGNOSIS — Z23 NEED FOR TDAP VACCINATION: ICD-10-CM

## 2025-05-13 DIAGNOSIS — Z01.419 WELL WOMAN EXAM: Primary | ICD-10-CM

## 2025-05-13 DIAGNOSIS — Z23 NEED FOR SHINGLES VACCINE: ICD-10-CM

## 2025-05-13 LAB
BILIRUB BLD-MCNC: NEGATIVE MG/DL
CLARITY, POC: CLEAR
COLOR UR: YELLOW
EXPIRATION DATE: ABNORMAL
GLUCOSE UR STRIP-MCNC: NEGATIVE MG/DL
KETONES UR QL: NEGATIVE
LEUKOCYTE EST, POC: ABNORMAL
Lab: ABNORMAL
NITRITE UR-MCNC: NEGATIVE MG/ML
PH UR: 6.5 [PH] (ref 5–8)
PROT UR STRIP-MCNC: NEGATIVE MG/DL
RBC # UR STRIP: NEGATIVE /UL
SP GR UR: 1.01 (ref 1–1.03)
UROBILINOGEN UR QL: ABNORMAL

## 2025-05-13 PROCEDURE — G0123 SCREEN CERV/VAG THIN LAYER: HCPCS | Performed by: NURSE PRACTITIONER

## 2025-05-13 RX ORDER — IBUPROFEN 800 MG/1
800 TABLET, FILM COATED ORAL EVERY 8 HOURS PRN
COMMUNITY
Start: 2025-03-24

## 2025-05-13 NOTE — PROGRESS NOTES
"Chief Complaint  Gynecologic Exam and Annual Exam    Subjective          Lluvia Becerra presents to [facility]     Patient is a 64-year-old female who is here today for well woman exam.  Last Pap done more than 3 years ago and negative.  No history of abnormal Pap smears.  Mammogram negative in February.  Started menopause in her early 50s.  Would like to get shingles vaccine.  She previously received Zostavax has not had a tetanus booster in the last 10 years.  Would like to get Tdap while she is here today as well.     Objective   Vital Signs:   Vitals:    05/13/25 1023 05/13/25 1056   BP: 141/87 128/78   BP Location: Left arm Right arm   Patient Position: Sitting Sitting   Cuff Size: Adult Adult   Pulse: 72    Temp: 97.8 °F (36.6 °C)    TempSrc: Temporal    SpO2: 97%    Weight: 71.8 kg (158 lb 3.2 oz)    Height: 157.5 cm (62.01\")        Wt Readings from Last 3 Encounters:   05/13/25 71.8 kg (158 lb 3.2 oz)   02/12/25 68 kg (150 lb)   11/12/24 69.4 kg (153 lb)      BP Readings from Last 3 Encounters:   05/13/25 128/78   02/12/25 123/83   11/12/24 134/86       Body mass index is 28.93 kg/m².             Physical Exam  Vitals reviewed.   Constitutional:       General: She is not in acute distress.     Appearance: Normal appearance. She is well-developed.   Neck:      Thyroid: No thyromegaly.      Vascular: No carotid bruit.   Cardiovascular:      Rate and Rhythm: Normal rate and regular rhythm.      Heart sounds: Normal heart sounds.   Pulmonary:      Effort: Pulmonary effort is normal.      Breath sounds: Normal breath sounds.   Chest:   Breasts:     Right: Normal.      Left: Normal.   Genitourinary:     General: Normal vulva.      Vagina: Normal.      Cervix: Normal.      Uterus: Normal.       Adnexa: Right adnexa normal and left adnexa normal.   Musculoskeletal:      Right lower leg: No edema.      Left lower leg: No edema.   Lymphadenopathy:      Upper Body:      Right upper " body: No axillary adenopathy.      Left upper body: No axillary adenopathy.   Skin:     General: Skin is warm and dry.   Neurological:      General: No focal deficit present.      Mental Status: She is alert.   Psychiatric:         Attention and Perception: Attention normal.         Mood and Affect: Mood and affect normal.         Behavior: Behavior normal.           Current Outpatient Medications:     ibuprofen (ADVIL,MOTRIN) 800 MG tablet, Take 1 tablet by mouth Every 8 (Eight) Hours As Needed., Disp: , Rfl:     Loratadine (Claritin) 10 MG capsule, Take 1 capsule by mouth As Needed., Disp: , Rfl:     losartan (COZAAR) 50 MG tablet, TAKE ONE TABLET BY MOUTH EVERY DAY, Disp: 90 tablet, Rfl: 1    Melatonin 10 MG tablet, Take 1 tablet by mouth Every Night., Disp: , Rfl:     metoprolol succinate XL (TOPROL-XL) 50 MG 24 hr tablet, Take 1 tablet by mouth Daily., Disp: 90 tablet, Rfl: 3    pravastatin (PRAVACHOL) 20 MG tablet, Take 1 tablet by mouth every night at bedtime., Disp: 90 tablet, Rfl: 3    VITAMIN D PO, Take  by mouth., Disp: , Rfl:    Past Medical History:   Diagnosis Date    Abnormal ECG     Allergic     Cataract 2024    Small    Colon polyp     Headache     HL (hearing loss)     Mild    Hyperlipidemia     Hypertension     Visual impairment     Bi focals     No Known Allergies            Result Review :     Common labs          11/12/2024    11:22 1/16/2025    08:50   Common Labs   Glucose 92  92    BUN 15  14    Creatinine 0.91  0.90    Sodium 132  138    Potassium 4.6  4.4    Chloride 98  102    Calcium 9.5  9.2    Albumin 4.5  4.2    Total Bilirubin 0.8  0.6    Alkaline Phosphatase 102  101    AST (SGOT) 27  25    ALT (SGPT) 46  36    Total Cholesterol 191     Triglycerides 108     HDL Cholesterol 54     LDL Cholesterol  118          Mammo Screening Digital Tomosynthesis Bilateral With CAD  Result Date: 2/7/2025  No mammographic evidence of malignancy.  Recommend annual screening mammography.  BI-RADS  ASSESSMENT: BI-RADS 2. Benign findings.  Note:  It has been reported that there is approximately a 15% false negative rate in mammography.  Therefore, management of a palpable abnormality should not be deferred because of a negative mammogram.  Electronically Signed By-DREW PAGE MD On:2025 1:34 PM               Social History     Tobacco Use   Smoking Status Former    Current packs/day: 0.00    Average packs/day: 0.3 packs/day for 1 year (0.3 ttl pk-yrs)    Types: Cigarettes    Start date: 1976    Quit date: 1977    Years since quittin.3   Smokeless Tobacco Never   Tobacco Comments    Only in high school           Assessment and Plan    Diagnoses and all orders for this visit:    1. Well woman exam (Primary)  -     POCT urinalysis dipstick, automated  -     PAP, Liquid Based ([facility] Only); Future    2. Need for Tdap vaccination  -     Tdap Vaccine => 8yo IM (BOOSTRIX/ADACEL)    3. Need for shingles vaccine  -     Shingrix Vaccine        Follow Up    No follow-ups on file.  Patient was given instructions and counseling regarding her condition or for health maintenance advice. Please see specific information pulled into the AVS if appropriate.

## 2025-05-13 NOTE — ASSESSMENT & PLAN NOTE
Other treatment recommendations pending testing results.  Patient denies any concerns for urinary tract infection and defers need for urine culture today due to trace leukocytes.  I am not concerned regarding this finding due to patient being asymptomatic.

## 2025-05-15 LAB
CYTOLOGIST CVX/VAG CYTO: NORMAL
CYTOLOGY CVX/VAG DOC CYTO: NORMAL
DX ICD CODE: NORMAL
OTHER STN SPEC: NORMAL
SERVICE CMNT-IMP: NORMAL
STAT OF ADQ CVX/VAG CYTO-IMP: NORMAL

## 2025-07-15 ENCOUNTER — CLINICAL SUPPORT (OUTPATIENT)
Dept: FAMILY MEDICINE CLINIC | Age: 65
End: 2025-07-15
Payer: COMMERCIAL

## 2025-07-15 DIAGNOSIS — Z23 IMMUNIZATION DUE: Primary | ICD-10-CM

## 2025-07-15 PROCEDURE — 90750 HZV VACC RECOMBINANT IM: CPT | Performed by: FAMILY MEDICINE

## 2025-07-21 DIAGNOSIS — I10 ESSENTIAL HYPERTENSION: ICD-10-CM

## 2025-07-21 RX ORDER — LOSARTAN POTASSIUM 50 MG/1
50 TABLET ORAL DAILY
Qty: 90 TABLET | Refills: 1 | OUTPATIENT
Start: 2025-07-21